# Patient Record
Sex: MALE | Race: BLACK OR AFRICAN AMERICAN | NOT HISPANIC OR LATINO | Employment: OTHER | URBAN - NONMETROPOLITAN AREA
[De-identification: names, ages, dates, MRNs, and addresses within clinical notes are randomized per-mention and may not be internally consistent; named-entity substitution may affect disease eponyms.]

---

## 2017-01-05 DIAGNOSIS — L60.0 INGROWN TOENAIL: Primary | ICD-10-CM

## 2017-01-13 ENCOUNTER — OFFICE VISIT (OUTPATIENT)
Dept: PODIATRY | Facility: CLINIC | Age: 57
End: 2017-01-13

## 2017-01-13 VITALS
SYSTOLIC BLOOD PRESSURE: 160 MMHG | HEART RATE: 96 BPM | WEIGHT: 221 LBS | BODY MASS INDEX: 30.94 KG/M2 | HEIGHT: 71 IN | DIASTOLIC BLOOD PRESSURE: 90 MMHG

## 2017-01-13 DIAGNOSIS — L84 CALLUS OF FOOT: ICD-10-CM

## 2017-01-13 DIAGNOSIS — B35.1 ONYCHOMYCOSIS: Primary | ICD-10-CM

## 2017-01-13 DIAGNOSIS — Z72.0 TOBACCO ABUSE: ICD-10-CM

## 2017-01-13 DIAGNOSIS — M79.671 FOOT PAIN, RIGHT: ICD-10-CM

## 2017-01-13 PROCEDURE — 11055 PARING/CUTG B9 HYPRKER LES 1: CPT | Performed by: PODIATRIST

## 2017-01-13 PROCEDURE — 99203 OFFICE O/P NEW LOW 30 MIN: CPT | Performed by: PODIATRIST

## 2017-01-13 PROCEDURE — 11720 DEBRIDE NAIL 1-5: CPT | Performed by: PODIATRIST

## 2017-01-13 RX ORDER — UREA 200 MG/G
GEL TOPICAL 2 TIMES DAILY
Qty: 90 G | Refills: 0 | Status: SHIPPED | OUTPATIENT
Start: 2017-01-13 | End: 2017-03-29

## 2017-01-13 NOTE — MR AVS SNAPSHOT
Favian Galeana   1/13/2017 1:15 PM   Office Visit    Dept Phone:  [phone]   Encounter #:  [id number]    Provider:  Gio Garcia DPM   Department:  [facility] PODIATRY                Your Full Care Plan              Today's Medication Changes          These changes are accurate as of: 1/13/17  1:49 PM.  If you have any questions, ask your nurse or doctor.               New Medication(s)Ordered:     urea 20 % cream   Commonly known as:  CARMOL   Apply  topically 2 (Two) Times a Day. Apply twice daily to calluses.   Started by:  Gio Garcia DPM            Where to Get Your Medications      These medications were sent to [facility] [address] [facility] [place], [place] - [address] AT [facility] [address] - [phone]  - [phone] FX  [address], [facility] [address]    Hours:  24-hours Phone:  [phone]     urea 20 % cream                  Your Updated Medication List          This list is accurate as of: 1/13/17  1:49 PM.  Always use your most recent med list.                albuterol 108 (90 BASE) MCG/ACT inhaler   Commonly known as:  PROVENTIL HFA;VENTOLIN HFA   Inhale 2 puffs Every 4 (Four) Hours As Needed for wheezing.       amLODIPine 10 MG tablet   Commonly known as:  NORVASC   Take 1 tablet by mouth Daily.       atorvastatin 40 MG tablet   Commonly known as:  LIPITOR   Take 1 tablet by mouth Daily.       azithromycin 250 MG tablet   Commonly known as:  ZITHROMAX Z-KEM   Take 2 tablets the first day, then 1 tablet daily for 4 days.       bisacodyl 5 MG EC tablet   Commonly known as:  DULCOLAX       gabapentin 300 MG capsule   Commonly known as:  NEURONTIN   Take 3 capsules by mouth 3 (Three) Times a Day.       hydrOXYzine 50 MG capsule   Commonly known as:  VISTARIL       losartan-hydrochlorothiazide 100-25 MG per tablet   Commonly known as:  HYZAAR   Take 1 tablet by mouth Daily.       omeprazole 40 MG capsule   Commonly  known as:  priLOSEC   Take 1 capsule by mouth Daily.       ondansetron 4 MG tablet   Commonly known as:  ZOFRAN       * oxyCODONE-acetaminophen 5-325 MG per tablet   Commonly known as:  PERCOCET       * oxyCODONE-acetaminophen  MG per tablet   Commonly known as:  PERCOCET       phenylephrine-promethazine-codeine 5-6.25-10 MG/5ML syrup syrup   Commonly known as:  PHENERGAN VC with CODEINE   Take 5 mL by mouth Every 4 (Four) Hours As Needed (cough).       urea 20 % cream   Commonly known as:  CARMOL   Apply  topically 2 (Two) Times a Day. Apply twice daily to calluses.       * Notice:  This list has 2 medication(s) that are the same as other medications prescribed for you. Read the directions carefully, and ask your doctor or other care provider to review them with you.            You Were Diagnosed With        Codes Comments    Onychomycosis    -  Primary ICD-10-CM: B35.1  ICD-9-CM: 110.1     Callus of foot     ICD-10-CM: L84  ICD-9-CM: 700     Foot pain, right     ICD-10-CM: M79.671  ICD-9-CM: 729.5       Instructions    Corns and Calluses  Corns are small areas of thickened skin that occur on the top, sides, or tip of a toe. They contain a cone-shaped core with a point that can press on a nerve below. This causes pain. Calluses are areas of thickened skin that can occur anywhere on the body including hands, fingers, palms, soles of the feet, and heels. Calluses are usually larger than corns.   CAUSES   Corns and calluses are caused by rubbing (friction) or pressure, such as from shoes that are too tight or do not fit properly.   RISK FACTORS  Corns are more likely to develop in people who have toe deformities, such as hammer toes.  Since calluses can occur with friction to any area of the skin, calluses are more likely to develop in people who:   · Work with their hands.  · Wear shoes that fit poorly, shoes that are too tight, or shoes that are high-heeled.  · Have toes deformities.  SYMPTOMS  Symptoms of a  corn or callus include:  · A hard growth on the skin.    · Pain or tenderness under the skin.    · Redness and swelling.    · Increased discomfort while wearing tight-fitting shoes.  DIAGNOSIS   Corns and calluses may be diagnosed with a medical history and physical exam.   TREATMENT   Corns and calluses may be treated with:  · Removing the cause of the friction or pressure. This may include:    Changing your shoes.    Wearing shoe inserts (orthotics) or other protective layers in your shoes, such as a corn pad.    Wearing gloves.  · Medicines to help soften skin in the hardened, thickened areas.  · Reducing the size of the corn or callus by removing the dead layers of skin.  · Antibiotic medicines to treat infection.  · Surgery, if a toe deformity is the cause.  HOME CARE INSTRUCTIONS   · Take medicines only as directed by your health care provider.  · If you were prescribed an antibiotic, finish all of it even if you start to feel better.  · Wear shoes that fit well. Avoid wearing high-heeled shoes and shoes that are too tight or too loose.  · Wear any padding, protective layers, gloves, or orthotics as directed by your health care provider.  · Soak your hands or feet and then use a file or pumice stone to soften your corn or callus. Do this as directed by your health care provider.  · Check your corn or callus every day for signs of infection. Watch for:    Redness, swelling, or pain.    Fluid, blood, or pus.  SEEK MEDICAL CARE IF:   · Your symptoms do not improve with treatment.  · You have increased redness, swelling, or pain at the site of your corn or callus.  · You have fluid, blood, or pus coming from your corn or callus.  · You have new symptoms.     This information is not intended to replace advice given to you by your health care provider. Make sure you discuss any questions you have with your health care provider.     Document Released: 09/23/2005 Document Revised: 05/03/2016 Document Reviewed:  12/14/2015  [employer] Interactive Patient Education ©2016 [employer]  Nail Ringworm  A fungal infection of the nail (tinea unguium/onychomycosis) is common. It is common as the visible part of the nail is composed of dead cells which have no blood supply to help prevent infection. It occurs because fungi are everywhere and will pick any opportunity to grow on any dead material.  Because nails are very slow growing they require up to 2 years of treatment with anti-fungal medications. The entire nail back to the base is infected. This includes approximately ? of the nail which you cannot see.  If your caregiver has prescribed a medication by mouth, take it every day and as directed. No progress will be seen for at least 6 to 9 months. Do not be disappointed! Because fungi live on dead cells with little or no exposure to blood supply, medication delivery to the infection is slow; thus the cure is slow. It is also why you can observe no progress in the first 6 months. The nail becoming cured is the base of the nail, as it has the blood supply. Topical medication such as creams and ointments are usually not effective. Important in successful treatment of nail fungus is closely following the medication regimen that your doctor prescribes.  Sometimes you and your caregiver may elect to speed up this process by surgical removal of all the nails. Even this may still require 6 to 9 months of additional oral medications.  See your caregiver as directed. Remember there will be no visible improvement for at least 6 months. See your caregiver sooner if other signs of infection (redness and swelling) develop.     This information is not intended to replace advice given to you by your health care provider. Make sure you discuss any questions you have with your health care provider.     Document Released: 12/15/2001 Document Revised: 05/03/2016 Document Reviewed: 06/20/2016  [employer] Patient Education ©2016 [employer]       Patient Instructions History      Upcoming Appointments     Visit Type Date Time Department    NEW PATIENT 1/13/2017  1:15 PM [facility]ODI[facility]    FOLLOW UP 2/27/2017 10:00 AM [facility] Signup     Our records indicate that you have declined [facility]. If you would like to sign up for [facility], please email [facility] [email or web address] or call [phone] to obtain an activation code.             Other Info from Your Visit           Your Appointments     Feb 27, 2017 10:00 AM CST   Follow Up with Jenaro Riley DO   [facility] (--)    [address] [place] [address]  [facility] [address] [phone]           Arrive 15 minutes prior to appointment.              Allergies     Iodine        Reason for Visit     Right Foot - Ingrown Toenail, Pain           Vital Signs     Blood Pressure Pulse Height Weight Body Mass Index Smoking Status    160/90 96 71\" (180.3 cm) 221 lb (100 kg) 30.82 kg/m2 Current Every Day Smoker      Problems and Diagnoses Noted     Onychomycosis    -  Primary    Callus of foot        Foot pain, right            "

## 2017-01-13 NOTE — PATIENT INSTRUCTIONS
Corns and Calluses  Corns are small areas of thickened skin that occur on the top, sides, or tip of a toe. They contain a cone-shaped core with a point that can press on a nerve below. This causes pain. Calluses are areas of thickened skin that can occur anywhere on the body including hands, fingers, palms, soles of the feet, and heels. Calluses are usually larger than corns.   CAUSES   Corns and calluses are caused by rubbing (friction) or pressure, such as from shoes that are too tight or do not fit properly.   RISK FACTORS  Corns are more likely to develop in people who have toe deformities, such as hammer toes.  Since calluses can occur with friction to any area of the skin, calluses are more likely to develop in people who:   · Work with their hands.  · Wear shoes that fit poorly, shoes that are too tight, or shoes that are high-heeled.  · Have toes deformities.  SYMPTOMS  Symptoms of a corn or callus include:  · A hard growth on the skin.    · Pain or tenderness under the skin.    · Redness and swelling.    · Increased discomfort while wearing tight-fitting shoes.  DIAGNOSIS   Corns and calluses may be diagnosed with a medical history and physical exam.   TREATMENT   Corns and calluses may be treated with:  · Removing the cause of the friction or pressure. This may include:    Changing your shoes.    Wearing shoe inserts (orthotics) or other protective layers in your shoes, such as a corn pad.    Wearing gloves.  · Medicines to help soften skin in the hardened, thickened areas.  · Reducing the size of the corn or callus by removing the dead layers of skin.  · Antibiotic medicines to treat infection.  · Surgery, if a toe deformity is the cause.  HOME CARE INSTRUCTIONS   · Take medicines only as directed by your health care provider.  · If you were prescribed an antibiotic, finish all of it even if you start to feel better.  · Wear shoes that fit well. Avoid wearing high-heeled shoes and shoes that are too tight  or too loose.  · Wear any padding, protective layers, gloves, or orthotics as directed by your health care provider.  · Soak your hands or feet and then use a file or pumice stone to soften your corn or callus. Do this as directed by your health care provider.  · Check your corn or callus every day for signs of infection. Watch for:    Redness, swelling, or pain.    Fluid, blood, or pus.  SEEK MEDICAL CARE IF:   · Your symptoms do not improve with treatment.  · You have increased redness, swelling, or pain at the site of your corn or callus.  · You have fluid, blood, or pus coming from your corn or callus.  · You have new symptoms.     This information is not intended to replace advice given to you by your health care provider. Make sure you discuss any questions you have with your health care provider.     Document Released: 09/23/2005 Document Revised: 05/03/2016 Document Reviewed: 12/14/2015  [employer] Patient Education ©2016 [employer]  Nail Ringworm  A fungal infection of the nail (tinea unguium/onychomycosis) is common. It is common as the visible part of the nail is composed of dead cells which have no blood supply to help prevent infection. It occurs because fungi are everywhere and will pick any opportunity to grow on any dead material.  Because nails are very slow growing they require up to 2 years of treatment with anti-fungal medications. The entire nail back to the base is infected. This includes approximately ? of the nail which you cannot see.  If your caregiver has prescribed a medication by mouth, take it every day and as directed. No progress will be seen for at least 6 to 9 months. Do not be disappointed! Because fungi live on dead cells with little or no exposure to blood supply, medication delivery to the infection is slow; thus the cure is slow. It is also why you can observe no progress in the first 6 months. The nail becoming cured is the base of the nail, as it has the blood  supply. Topical medication such as creams and ointments are usually not effective. Important in successful treatment of nail fungus is closely following the medication regimen that your doctor prescribes.  Sometimes you and your caregiver may elect to speed up this process by surgical removal of all the nails. Even this may still require 6 to 9 months of additional oral medications.  See your caregiver as directed. Remember there will be no visible improvement for at least 6 months. See your caregiver sooner if other signs of infection (redness and swelling) develop.     This information is not intended to replace advice given to you by your health care provider. Make sure you discuss any questions you have with your health care provider.     Document Released: 12/15/2001 Document Revised: 05/03/2016 Document Reviewed: 06/20/2016  [facility] Interactive Patient Education ©2016 [employer] Inc.

## 2017-01-13 NOTE — PROGRESS NOTES
Today's Date: 01/13/2017    Favian Galeana  MRN: [id number]  CSN: [id number]  PCP: Jenaro Riley DO      SUBJECTIVE     Chief Complaint   Patient presents with   • Right Foot - Ingrown Toenail, Pain     HPI: Favian Galeana, a 56 y.o.male, presents to clinic as a new patient complaining of painful right great toenail. Pt is non-diabetic but admits smoking 1 ppd. Relates the toe is extremely pain to the point he doesn't want to wear shoes or have anyone touch it. Relates he has had this problem off and on for many years.  Denies any constitutional symptoms. No other pedal complaints at this time.    Past Medical History   Diagnosis Date   • GERD (gastroesophageal reflux disease)    • Hypertension    • Neuropathy        Past Surgical History   Procedure Laterality Date   • Back surgery     • Exploratory laparotomy     • Hernia repair       3       Family History   Problem Relation Age of Onset   • Breast cancer Mother    • No Known Problems Father    • Prostate cancer Maternal Grandfather        Social History     Social History   • Marital status:      Spouse name: N/A   • Number of children: N/A   • Years of education: N/A     Occupational History   • Not on file.     Social History Main Topics   • Smoking status: Current Every Day Smoker     Packs/day: 1.00   • Smokeless tobacco: Never Used   • Alcohol use Yes   • Drug use: No   • Sexual activity: No     Other Topics Concern   • Not on file     Social History Narrative       Allergies   Allergen Reactions   • Iodine        Prior to Admission medications    Medication Sig Start Date End Date Taking? Authorizing Provider   albuterol (PROVENTIL HFA;VENTOLIN HFA) 108 (90 BASE) MCG/ACT inhaler Inhale 2 puffs Every 4 (Four) Hours As Needed for wheezing. 10/5/16  Yes CRISTINE Cardona   amLODIPine (NORVASC) 10 MG tablet Take 1 tablet by mouth Daily. 10/18/16  Yes Jenaro Riley DO   atorvastatin (LIPITOR) 40 MG tablet Take 1 tablet  by mouth Daily. 11/28/16  Yes Jenaro Riley, DO   azithromycin (ZITHROMAX Z-KEM) 250 MG tablet Take 2 tablets the first day, then 1 tablet daily for 4 days. 12/9/16  Yes SEAN Nelson   bisacodyl (DULCOLAX) 5 MG EC tablet Take 5 mg by mouth Daily As Needed for constipation.   Yes Historical Provider, MD   gabapentin (NEURONTIN) 300 MG capsule Take 3 capsules by mouth 3 (Three) Times a Day. 11/28/16  Yes Jenaro Riley, DO   hydrOXYzine (VISTARIL) 50 MG capsule Take 50 mg by mouth 3 (Three) Times a Day As Needed. 9/23/16  Yes Historical Provider, MD   losartan-hydrochlorothiazide (HYZAAR) 100-25 MG per tablet Take 1 tablet by mouth Daily. 11/28/16  Yes Jenaro Riley, DO   omeprazole (PriLOSEC) 40 MG capsule Take 1 capsule by mouth Daily. 10/18/16  Yes Jenaro Riley, DO   ondansetron (ZOFRAN) 4 MG tablet Take 4 mg by mouth Every 4 (Four) Hours As Needed. 7/14/16  Yes Historical Provider, MD   oxyCODONE-acetaminophen (PERCOCET)  MG per tablet Take 1 tablet by mouth 3 (Three) Times a Day.   Yes Historical Provider, MD   oxyCODONE-acetaminophen (PERCOCET) 5-325 MG per tablet Take 1 tablet by mouth Every 6 (Six) Hours As Needed.   Yes Historical Provider, MD   phenylephrine-promethazine-codeine (PHENERGAN VC with CODEINE) 5-6.25-10 MG/5ML syrup syrup Take 5 mL by mouth Every 4 (Four) Hours As Needed (cough). 12/9/16  Yes Mariajose Ny MD   urea (CARMOL) 20 % cream Apply  topically 2 (Two) Times a Day. Apply twice daily to calluses. 1/13/17   Gio Garcia DPM       Review of Systems   Constitutional: Negative for chills and fever.   HENT: Negative for congestion.    Respiratory: Negative for shortness of breath.    Cardiovascular: Negative for chest pain and leg swelling.   Gastrointestinal: Negative for constipation, diarrhea, nausea and vomiting.   Musculoskeletal:        Painful right hallux   Neurological: Negative for numbness.       OBJECTIVE     Vitals:     01/13/17 1315   BP: 160/90   Pulse: 96       PHYSICAL EXAM  GEN:   A&Ox3, NAD, Pt ambulates to clinic without assistance and wearing casual shoes. Accompanied by wife.    NEURO:   Protective sensation intact to 10/10 sites Right foot, 10/10 site Left foot using Taylor-Alejo monofilament  Light touch sensation intact  No Tinel's or Villeux sign.    VASC:  Skin temperature warm to warm proximal to distal lex  DP pulses 2/4 Right, 2/4 Left  PT pulses 1/4 Right, 1/4 Left  CFT <3sec lex  No edema noted lex  No varicosities noted lex    MUSC/SKEL:  Muscle Strength Right foot Dorsiflexors 5/5, Plantarflexors 5/5, Evertors 5/5, Invertors 5/5  Muscle Strength Left foot Dorsiflexors 5/5, Plantarflexors 5/5, Evertors 5/5, Invertors 5/5  POP of distal right hallux and nail    DERM:  Pedal nails 1 on right is thickened, elongated, discolored and incurvated at borders with subungual debris. At distal aspect of digit is a nucleated hyperkeratotic lesion abutting the nail plate. No break in integument post debridement  Webspaces are c/d/i  Skin is mildly atrophic with no open wounds or sores appreciated.      PATHOLOGY RESULTS:      RADIOLOGY/NUCLEAR:      LABORATORY/CULTURE RESULTS:     Lab Results (last 24 hours)     ** No results found for the last 24 hours. **          ASSESSMENT/PLAN     Patient Active Problem List   Diagnosis   • Neuropathy   • Gastroesophageal reflux disease without esophagitis   • Encounter for preventive health examination   • Essential hypertension   • Tobacco abuse   • Mixed hyperlipidemia         ICD-10-CM ICD-9-CM   1. Onychomycosis B35.1 110.1   2. Callus of foot L84 700   3. Foot pain, right M79.671 729.5   4. Tobacco abuse Z72.0 305.1     -Pt was examined and evaluated  -Discussed findings and treatment options with patient in detail  -Reviewed any pertinent xrays, labs and studies from pt chart  -After verbal consent obtained, debridement of nail x1 of length and thickness without  incidence  -paring of callus x1 without incidence  -Pt may use emery board at home to maintain nails between visits  -Rx: Urea 20% Cream  -RTC PRN, or sooner if acute issues arise.              Please note that portions of this note may have been completed with a voice recognition program. Efforts were made to edit the dictations, but occasionally words are mistranscribed.

## 2017-02-14 ENCOUNTER — HOSPITAL ENCOUNTER (EMERGENCY)
Facility: HOSPITAL | Age: 57
Discharge: HOME | End: 2017-02-14
Admitting: EMERGENCY MEDICINE

## 2017-02-14 ENCOUNTER — APPOINTMENT (OUTPATIENT)
Dept: GENERAL RADIOLOGY | Facility: HOSPITAL | Age: 57
End: 2017-02-14

## 2017-02-14 ENCOUNTER — APPOINTMENT (OUTPATIENT)
Dept: CT IMAGING | Facility: HOSPITAL | Age: 57
End: 2017-02-14

## 2017-02-14 VITALS
RESPIRATION RATE: 18 BRPM | SYSTOLIC BLOOD PRESSURE: 133 MMHG | HEART RATE: 89 BPM | WEIGHT: 216.38 LBS | DIASTOLIC BLOOD PRESSURE: 87 MMHG | TEMPERATURE: 99 F | HEIGHT: 71 IN | BODY MASS INDEX: 30.29 KG/M2 | OXYGEN SATURATION: 100 %

## 2017-02-14 DIAGNOSIS — K40.90 INGUINAL HERNIA WITHOUT OBSTRUCTION OR GANGRENE, RECURRENCE NOT SPECIFIED, UNSPECIFIED LATERALITY: ICD-10-CM

## 2017-02-14 DIAGNOSIS — J06.9 UPPER RESPIRATORY TRACT INFECTION, UNSPECIFIED TYPE: Primary | ICD-10-CM

## 2017-02-14 DIAGNOSIS — J11.1 INFLUENZA: ICD-10-CM

## 2017-02-14 LAB
ALBUMIN SERPL-MCNC: 4 G/DL (ref 3.5–5)
ALBUMIN/GLOB SERPL: 1.2 G/DL (ref 1.1–2.5)
ALP SERPL-CCNC: 122 U/L (ref 24–120)
ALT SERPL W P-5'-P-CCNC: 48 U/L (ref 0–54)
AMYLASE SERPL-CCNC: 142 U/L (ref 30–110)
ANION GAP SERPL CALCULATED.3IONS-SCNC: 9 MMOL/L (ref 4–13)
APTT PPP: 32 SECONDS (ref 24.1–34.8)
AST SERPL-CCNC: 33 U/L (ref 7–45)
BASOPHILS # BLD AUTO: 0.02 10*3/MM3 (ref 0–0.2)
BASOPHILS NFR BLD AUTO: 0.2 % (ref 0–2)
BILIRUB SERPL-MCNC: 0.5 MG/DL (ref 0.1–1)
BILIRUB UR QL STRIP: NEGATIVE
BUN BLD-MCNC: 12 MG/DL (ref 5–21)
BUN/CREAT SERPL: 9.8 (ref 7–25)
CALCIUM SPEC-SCNC: 8.8 MG/DL (ref 8.4–10.4)
CHLORIDE SERPL-SCNC: 108 MMOL/L (ref 98–110)
CLARITY UR: CLEAR
CO2 SERPL-SCNC: 24 MMOL/L (ref 24–31)
COLOR UR: YELLOW
CREAT BLD-MCNC: 1.22 MG/DL (ref 0.5–1.4)
CRP SERPL-MCNC: <0.5 MG/DL (ref 0–0.99)
D-LACTATE SERPL-SCNC: 1.2 MMOL/L (ref 0.5–2)
DEPRECATED RDW RBC AUTO: 48.1 FL (ref 40–54)
EOSINOPHIL # BLD AUTO: 0.21 10*3/MM3 (ref 0–0.7)
EOSINOPHIL NFR BLD AUTO: 2.5 % (ref 0–4)
ERYTHROCYTE [DISTWIDTH] IN BLOOD BY AUTOMATED COUNT: 15.4 % (ref 12–15)
FLUAV AG NPH QL: NEGATIVE
FLUBV AG NPH QL IA: NEGATIVE
GFR SERPL CREATININE-BSD FRML MDRD: 74 ML/MIN/1.73
GLOBULIN UR ELPH-MCNC: 3.3 GM/DL
GLUCOSE BLD-MCNC: 99 MG/DL (ref 70–100)
GLUCOSE UR STRIP-MCNC: NEGATIVE MG/DL
HCT VFR BLD AUTO: 40 % (ref 40–52)
HGB BLD-MCNC: 13.3 G/DL (ref 14–18)
HGB UR QL STRIP.AUTO: NEGATIVE
HOLD SPECIMEN: NORMAL
HOLD SPECIMEN: NORMAL
IMM GRANULOCYTES # BLD: 0.04 10*3/MM3 (ref 0–0.03)
IMM GRANULOCYTES NFR BLD: 0.5 % (ref 0–5)
INR PPP: 0.94 (ref 0.91–1.09)
KETONES UR QL STRIP: NEGATIVE
LEUKOCYTE ESTERASE UR QL STRIP.AUTO: NEGATIVE
LIPASE SERPL-CCNC: 200 U/L (ref 23–203)
LYMPHOCYTES # BLD AUTO: 1.59 10*3/MM3 (ref 0.72–4.86)
LYMPHOCYTES NFR BLD AUTO: 19 % (ref 15–45)
MCH RBC QN AUTO: 28.6 PG (ref 28–32)
MCHC RBC AUTO-ENTMCNC: 33.3 G/DL (ref 33–36)
MCV RBC AUTO: 86 FL (ref 82–95)
MONOCYTES # BLD AUTO: 1.11 10*3/MM3 (ref 0.19–1.3)
MONOCYTES NFR BLD AUTO: 13.3 % (ref 4–12)
NEUTROPHILS # BLD AUTO: 5.38 10*3/MM3 (ref 1.87–8.4)
NEUTROPHILS NFR BLD AUTO: 64.5 % (ref 39–78)
NITRITE UR QL STRIP: NEGATIVE
PH UR STRIP.AUTO: 5.5 [PH] (ref 5–8)
PLATELET # BLD AUTO: 285 10*3/MM3 (ref 130–400)
PMV BLD AUTO: 10.6 FL (ref 6–12)
POTASSIUM BLD-SCNC: 3.8 MMOL/L (ref 3.5–5.3)
PROCALCITONIN SERPL-MCNC: <0.25 NG/ML
PROT SERPL-MCNC: 7.3 G/DL (ref 6.3–8.7)
PROT UR QL STRIP: NEGATIVE
PROTHROMBIN TIME: 12.8 SECONDS (ref 11.9–14.6)
RBC # BLD AUTO: 4.65 10*6/MM3 (ref 4.8–5.9)
S PYO AG THROAT QL: NEGATIVE
SODIUM BLD-SCNC: 141 MMOL/L (ref 135–145)
SP GR UR STRIP: 1.01 (ref 1–1.03)
TROPONIN I SERPL-MCNC: 0 NG/ML (ref 0–0.07)
UROBILINOGEN UR QL STRIP: NORMAL
WBC NRBC COR # BLD: 8.35 10*3/MM3 (ref 4.8–10.8)
WHOLE BLOOD HOLD SPECIMEN: NORMAL
WHOLE BLOOD HOLD SPECIMEN: NORMAL

## 2017-02-14 PROCEDURE — 99285 EMERGENCY DEPT VISIT HI MDM: CPT

## 2017-02-14 PROCEDURE — 84484 ASSAY OF TROPONIN QUANT: CPT

## 2017-02-14 PROCEDURE — 87880 STREP A ASSAY W/OPTIC: CPT | Performed by: NURSE PRACTITIONER

## 2017-02-14 PROCEDURE — 82150 ASSAY OF AMYLASE: CPT | Performed by: NURSE PRACTITIONER

## 2017-02-14 PROCEDURE — 84145 PROCALCITONIN (PCT): CPT | Performed by: NURSE PRACTITIONER

## 2017-02-14 PROCEDURE — 71020 HC CHEST PA AND LATERAL: CPT

## 2017-02-14 PROCEDURE — 80053 COMPREHEN METABOLIC PANEL: CPT

## 2017-02-14 PROCEDURE — 96361 HYDRATE IV INFUSION ADD-ON: CPT

## 2017-02-14 PROCEDURE — 87804 INFLUENZA ASSAY W/OPTIC: CPT | Performed by: NURSE PRACTITIONER

## 2017-02-14 PROCEDURE — 83605 ASSAY OF LACTIC ACID: CPT | Performed by: NURSE PRACTITIONER

## 2017-02-14 PROCEDURE — 87081 CULTURE SCREEN ONLY: CPT | Performed by: NURSE PRACTITIONER

## 2017-02-14 PROCEDURE — 85025 COMPLETE CBC W/AUTO DIFF WBC: CPT

## 2017-02-14 PROCEDURE — 74176 CT ABD & PELVIS W/O CONTRAST: CPT

## 2017-02-14 PROCEDURE — 81003 URINALYSIS AUTO W/O SCOPE: CPT | Performed by: NURSE PRACTITIONER

## 2017-02-14 PROCEDURE — 85730 THROMBOPLASTIN TIME PARTIAL: CPT | Performed by: NURSE PRACTITIONER

## 2017-02-14 PROCEDURE — 93010 ELECTROCARDIOGRAM REPORT: CPT | Performed by: INTERNAL MEDICINE

## 2017-02-14 PROCEDURE — 86140 C-REACTIVE PROTEIN: CPT | Performed by: NURSE PRACTITIONER

## 2017-02-14 PROCEDURE — 87040 BLOOD CULTURE FOR BACTERIA: CPT | Performed by: NURSE PRACTITIONER

## 2017-02-14 PROCEDURE — 83690 ASSAY OF LIPASE: CPT | Performed by: NURSE PRACTITIONER

## 2017-02-14 PROCEDURE — 96360 HYDRATION IV INFUSION INIT: CPT

## 2017-02-14 PROCEDURE — 85610 PROTHROMBIN TIME: CPT | Performed by: NURSE PRACTITIONER

## 2017-02-14 PROCEDURE — 93005 ELECTROCARDIOGRAM TRACING: CPT | Performed by: NURSE PRACTITIONER

## 2017-02-14 RX ORDER — ONDANSETRON 2 MG/ML
4 INJECTION INTRAMUSCULAR; INTRAVENOUS ONCE
Status: DISCONTINUED | OUTPATIENT
Start: 2017-02-14 | End: 2017-02-14 | Stop reason: HOSPADM

## 2017-02-14 RX ORDER — SODIUM CHLORIDE 0.9 % (FLUSH) 0.9 %
10 SYRINGE (ML) INJECTION AS NEEDED
Status: DISCONTINUED | OUTPATIENT
Start: 2017-02-14 | End: 2017-02-14 | Stop reason: HOSPADM

## 2017-02-14 RX ORDER — DEXTROMETHORPHAN HYDROBROMIDE AND PROMETHAZINE HYDROCHLORIDE 15; 6.25 MG/5ML; MG/5ML
5 SYRUP ORAL 4 TIMES DAILY PRN
Qty: 180 ML | Refills: 0 | Status: SHIPPED | OUTPATIENT
Start: 2017-02-14 | End: 2017-02-17

## 2017-02-14 RX ORDER — METHYLPREDNISOLONE 4 MG/1
TABLET ORAL
Qty: 21 TABLET | Refills: 0 | Status: SHIPPED | OUTPATIENT
Start: 2017-02-14 | End: 2017-03-29

## 2017-02-14 RX ORDER — OSELTAMIVIR PHOSPHATE 75 MG/1
75 CAPSULE ORAL 2 TIMES DAILY
Qty: 10 CAPSULE | Refills: 0 | Status: SHIPPED | OUTPATIENT
Start: 2017-02-14 | End: 2017-02-19

## 2017-02-14 RX ORDER — ONDANSETRON 4 MG/1
4 TABLET, ORALLY DISINTEGRATING ORAL EVERY 6 HOURS PRN
Qty: 12 TABLET | Refills: 0 | Status: SHIPPED | OUTPATIENT
Start: 2017-02-14 | End: 2017-02-17

## 2017-02-14 RX ORDER — AZITHROMYCIN 250 MG/1
TABLET, FILM COATED ORAL
Qty: 6 TABLET | Refills: 0 | Status: SHIPPED | OUTPATIENT
Start: 2017-02-14 | End: 2017-03-29

## 2017-02-14 RX ADMIN — SODIUM CHLORIDE 1000 ML: 9 INJECTION, SOLUTION INTRAVENOUS at 11:57

## 2017-02-14 NOTE — ED PROVIDER NOTES
"Subjective   HPI Comments: Patient is a 56-year-old -American male who presents ER today with complaint of cough congestion and abdominal pain  Patient reports that he has symptoms of cough congestion 2 days ago.  He is coughing up thick white phlegm.  Patient also reports hot flashes and chills at home.  He reports that he is grandchildren who live with him diagnosed with the flu yesterday.  Patient also reports that he is having some left lower abdominal pain.  Patient reports he has a history of inguinal hernia.  Reports he coughs this does calm out\".  He states he is able to reduce it himself without difficulty.  Patient states that he is however having some increased pain to the area like to be evaluated for this well.  He presents with his wife for evaluation today.    Patient is a 56 y.o. male presenting with abdominal pain.   History provided by:  Patient   used: No    Abdominal Pain   Pain location:  LLQ  Pain quality: sharp and stabbing    Pain radiates to:  Does not radiate  Pain severity:  Mild  Onset quality:  Sudden  Duration:  1 day  Timing:  Constant  Progression:  Unchanged  Chronicity:  New  Relieved by:  Nothing  Worsened by:  Nothing  Ineffective treatments:  None tried  Associated symptoms: chills and nausea    Associated symptoms: no anorexia, no belching, no chest pain, no constipation, no cough, no diarrhea, no dysuria, no fatigue, no fever, no flatus, no hematemesis, no hematochezia, no hematuria, no melena, no shortness of breath, no sore throat, no vaginal bleeding, no vaginal discharge and no vomiting    Risk factors: multiple surgeries and obesity    Risk factors: no alcohol abuse, no aspirin use, not elderly, no NSAID use, not pregnant and no recent hospitalization        Review of Systems   Constitutional: Positive for chills. Negative for fatigue and fever.   HENT: Negative for sore throat.    Respiratory: Negative for cough and shortness of breath.  "   Cardiovascular: Negative for chest pain.   Gastrointestinal: Positive for abdominal pain and nausea. Negative for anorexia, constipation, diarrhea, flatus, hematemesis, hematochezia, melena and vomiting.   Genitourinary: Negative for dysuria, hematuria, vaginal bleeding and vaginal discharge.   All other systems reviewed and are negative.      Past Medical History   Diagnosis Date   • GERD (gastroesophageal reflux disease)    • Hypertension    • Neuropathy        Allergies   Allergen Reactions   • Iodine        Past Surgical History   Procedure Laterality Date   • Back surgery     • Exploratory laparotomy     • Hernia repair       3       Family History   Problem Relation Age of Onset   • Breast cancer Mother    • No Known Problems Father    • Prostate cancer Maternal Grandfather        Social History     Social History   • Marital status:      Spouse name: N/A   • Number of children: N/A   • Years of education: N/A     Social History Main Topics   • Smoking status: Current Every Day Smoker     Packs/day: 1.00   • Smokeless tobacco: Never Used   • Alcohol use Yes   • Drug use: No   • Sexual activity: No     Other Topics Concern   • None     Social History Narrative           Objective   Physical Exam   Constitutional: He is oriented to person, place, and time. He appears well-developed and well-nourished.   HENT:   Head: Normocephalic and atraumatic.   Eyes: Conjunctivae are normal. Pupils are equal, round, and reactive to light.   Neck: Normal range of motion. Neck supple.   Cardiovascular: Normal rate, regular rhythm and normal heart sounds.    Pulmonary/Chest: Effort normal and breath sounds normal.   Abdominal: Soft. Bowel sounds are normal.       Musculoskeletal: Normal range of motion.   Neurological: He is alert and oriented to person, place, and time.   Skin: Skin is warm and dry.   Psychiatric: He has a normal mood and affect.   Nursing note and vitals reviewed.      Procedures         ED  Course  ED Course   Comment By Time   CXR: No acute cardiopulmonary disease. Dee Macias, APRN 02/14 1225   CT abd/pelvis: 1. A colon-containing left lateral abdominal wall hernia and  fat-containing left inguinal hernia is not significantly changed.  2. Small bowel containing right inguinal hernia is again seen though  there is now appears to be small amount of fluid and slightly increased  inflammation in the right inguinal region.  3. No bowel obstruction.    Dee Macias, APRN 02/14 1405   Patient labs at this time.  His white blood cell count was 8.35.  Urinalysis unremarkable, flu swab negative.  Lactic acid was normal 0.2.  CMP unremarkable. Dee Macias, APRN 02/14 1451   CT scan was reviewed with patient.  He is aware of the renal cyst.  I discussed the patient's results of the CT scan with Dr. Heard the surgeon on call.  He is recommended the patient follow up with Dr. Prieto is further evaluation area at this time patient be discharged home in stable condition.  I will give him some Zofran for his nausea.  I will go ahead and treat him were removed.  Family members have all tested positive.  I will place approximately Tamiflu.  I will give him an antibiotic for his cough as well as a Medrol Dosepak and some cough.  This time patient discharged home in stable condition. I have asked the pt to  please follow-up Dr. Prieto this week.  Lastly please follow-up her regular physician tomorrow for recheck.  He will be discharged home at this time stable condition. Dee Macias, APRN 02/14 1453                  MDM  Number of Diagnoses or Management Options  Influenza: new and requires workup  Inguinal hernia without obstruction or gangrene, recurrence not specified, unspecified laterality: new and requires workup  Upper respiratory tract infection, unspecified type: new and requires workup     Amount and/or Complexity of Data Reviewed  Clinical lab tests: ordered and reviewed  Tests in the  radiology section of CPT®: ordered and reviewed  Discuss the patient with other providers: yes    Patient Progress  Patient progress: stable      Final diagnoses:   Upper respiratory tract infection, unspecified type   Influenza   Inguinal hernia without obstruction or gangrene, recurrence not specified, unspecified laterality            Dee Macias, APRN  02/14/17 145

## 2017-02-16 LAB — BACTERIA SPEC AEROBE CULT: NORMAL

## 2017-02-19 LAB
BACTERIA SPEC AEROBE CULT: NORMAL
BACTERIA SPEC AEROBE CULT: NORMAL

## 2017-03-29 ENCOUNTER — APPOINTMENT (OUTPATIENT)
Dept: PREADMISSION TESTING | Facility: HOSPITAL | Age: 57
End: 2017-03-29

## 2017-03-29 VITALS
BODY MASS INDEX: 29.35 KG/M2 | HEIGHT: 70 IN | SYSTOLIC BLOOD PRESSURE: 150 MMHG | WEIGHT: 205 LBS | OXYGEN SATURATION: 100 % | RESPIRATION RATE: 20 BRPM | DIASTOLIC BLOOD PRESSURE: 90 MMHG | HEART RATE: 64 BPM

## 2017-03-29 LAB
ALBUMIN SERPL-MCNC: 4.1 G/DL (ref 3.5–5)
ALBUMIN/GLOB SERPL: 1.3 G/DL (ref 1.1–2.5)
ALP SERPL-CCNC: 91 U/L (ref 24–120)
ALT SERPL W P-5'-P-CCNC: 27 U/L (ref 0–54)
ANION GAP SERPL CALCULATED.3IONS-SCNC: 13 MMOL/L (ref 4–13)
AST SERPL-CCNC: 30 U/L (ref 7–45)
BASOPHILS # BLD AUTO: 0.05 10*3/MM3 (ref 0–0.2)
BASOPHILS NFR BLD AUTO: 0.6 % (ref 0–2)
BILIRUB SERPL-MCNC: 0.3 MG/DL (ref 0.1–1)
BUN BLD-MCNC: 16 MG/DL (ref 5–21)
BUN/CREAT SERPL: 12.1 (ref 7–25)
CALCIUM SPEC-SCNC: 9.4 MG/DL (ref 8.4–10.4)
CHLORIDE SERPL-SCNC: 105 MMOL/L (ref 98–110)
CO2 SERPL-SCNC: 25 MMOL/L (ref 24–31)
CREAT BLD-MCNC: 1.32 MG/DL (ref 0.5–1.4)
DEPRECATED RDW RBC AUTO: 45.3 FL (ref 40–54)
EOSINOPHIL # BLD AUTO: 0.24 10*3/MM3 (ref 0–0.7)
EOSINOPHIL NFR BLD AUTO: 3 % (ref 0–4)
ERYTHROCYTE [DISTWIDTH] IN BLOOD BY AUTOMATED COUNT: 14.2 % (ref 12–15)
GFR SERPL CREATININE-BSD FRML MDRD: 68 ML/MIN/1.73
GLOBULIN UR ELPH-MCNC: 3.2 GM/DL
GLUCOSE BLD-MCNC: 144 MG/DL (ref 70–100)
HCT VFR BLD AUTO: 42.3 % (ref 40–52)
HGB BLD-MCNC: 14.2 G/DL (ref 14–18)
IMM GRANULOCYTES # BLD: 0.02 10*3/MM3 (ref 0–0.03)
IMM GRANULOCYTES NFR BLD: 0.3 % (ref 0–5)
LYMPHOCYTES # BLD AUTO: 2.54 10*3/MM3 (ref 0.72–4.86)
LYMPHOCYTES NFR BLD AUTO: 32.2 % (ref 15–45)
MCH RBC QN AUTO: 29.3 PG (ref 28–32)
MCHC RBC AUTO-ENTMCNC: 33.6 G/DL (ref 33–36)
MCV RBC AUTO: 87.2 FL (ref 82–95)
MONOCYTES # BLD AUTO: 0.61 10*3/MM3 (ref 0.19–1.3)
MONOCYTES NFR BLD AUTO: 7.7 % (ref 4–12)
NEUTROPHILS # BLD AUTO: 4.44 10*3/MM3 (ref 1.87–8.4)
NEUTROPHILS NFR BLD AUTO: 56.2 % (ref 39–78)
PLATELET # BLD AUTO: 291 10*3/MM3 (ref 130–400)
PMV BLD AUTO: 10.4 FL (ref 6–12)
POTASSIUM BLD-SCNC: 3.8 MMOL/L (ref 3.5–5.3)
PROT SERPL-MCNC: 7.3 G/DL (ref 6.3–8.7)
RBC # BLD AUTO: 4.85 10*6/MM3 (ref 4.8–5.9)
SODIUM BLD-SCNC: 143 MMOL/L (ref 135–145)
WBC NRBC COR # BLD: 7.9 10*3/MM3 (ref 4.8–10.8)

## 2017-03-29 PROCEDURE — 93005 ELECTROCARDIOGRAM TRACING: CPT

## 2017-03-29 PROCEDURE — 93010 ELECTROCARDIOGRAM REPORT: CPT | Performed by: INTERNAL MEDICINE

## 2017-03-29 PROCEDURE — 80053 COMPREHEN METABOLIC PANEL: CPT | Performed by: SPECIALIST

## 2017-03-29 PROCEDURE — 85025 COMPLETE CBC W/AUTO DIFF WBC: CPT | Performed by: SPECIALIST

## 2017-03-29 NOTE — DISCHARGE INSTRUCTIONS
DAY OF SURGERY INSTRUCTIONS        YOUR SURGEON: April brennen    PROCEDURE: left inguinal hernia repair with mesh    DATE OF SURGERY: 3/31/2017    ARRIVAL TIME: AS DIRECTED BY OFFICE    DAY OF SURGERY TAKE ONLY THESE MEDICATIONS: ***none        BEFORE YOU COME TO THE HOSPITAL  (Pre-op instructions)  • Do not eat, drink, smoke or chew gum after midnight the night before surgery.  This also includes no mints.  • Morning of surgery take only the medicines you have been instructed with a sip of water unless otherwise instructed  by your physician.  • Do not shave, wear makeup or dark nail polish.  • Remove all jewelry including rings.  • Leave anything you consider valuable at home.  • Leave your suitcase in the car until after your surgery.  • Bring the following with you if applicable:  o Picture ID and insurance, Medicare or Medicaid cards  o Co-pay/deductible required by insurance (cash, check, credit card)  o Medications (no narcotics) in original bottles (not a list) including all over-the-counter medications.  o Copy of advance directive, living will or power-of- documents if not brought to PAT  o CPAP or BIPAP mask and tubing  o Skin prep instruction sheet  o Relaxation aids (MP3 player, book, magazine)  • Confirm your arrival time with you surgeon the day before your surgery (surgery times are subject to change)  • On the day of surgery check in at registration located at the main entrance of the hospital.       Outpatient Surgery Guidelines, Adult  Outpatient procedures are those for which the person having the procedure is allowed to go home the same day as the procedure. Various procedures are done on an outpatient basis. You should follow some general guidelines if you will be having an outpatient procedure.  LET YOUR HEALTH CARE PROVIDER KNOW ABOUT:  · Any allergies you have.  · All medicines you are taking, including vitamins, herbs, eye drops, creams, and over-the-counter  medicines.  · Previous problems you or members of your family have had with the use of anesthetics.  · Any blood disorders you have.  · Previous surgeries you have had.  · Medical conditions you have.  RISKS AND COMPLICATIONS  Your health care provider will discuss possible risks and complications with you before surgery. Common risks and complications include:    · Problems due to the use of anesthetics.  · Blood loss and replacement (does not apply to minor surgical procedures).  · Temporary increase in pain due to surgery.  · Uncorrected pain or problems that the surgery was meant to correct.  · Infection.  · New damage.  BEFORE THE PROCEDURE  · Ask your health care provider about changing or stopping your regular medicines. You may need to stop taking certain medicines in the days or weeks before the procedure.  · Stop smoking at least 2 weeks before surgery. This lowers your risk for complications during and after surgery. Ask your health care provider for help with this if needed.  · Eat your usual meals and a light supper the day before surgery. Continue fluid intake. Do not drink alcohol.  · Do not eat or drink after midnight the night before your surgery.   · Arrange for someone to take you home and to stay with you for 24 hours after the procedure. Medicine given for your procedure may affect your ability to drive or to care for yourself.  · Call your health care provider's office if you develop an illness or problem that may prevent you from safely having your procedure.  AFTER THE PROCEDURE  After surgery, you will be taken to a recovery area, where your progress will be monitored. If there are no complications, you will be allowed to go home when you are awake, stable, and taking fluids well. You may have numbness around the surgical site. Healing will take some time. You will have tenderness at the surgical site and may have some swelling and bruising. You may also have some nausea.  HOME CARE  INSTRUCTIONS  · Do not drive for 24 hours, or as directed by your health care provider. Do not drive while taking prescription pain medicines.  · Do not drink alcohol for 24 hours.  · Do not make important decisions or sign legal documents for 24 hours.  · You may resume a normal diet and activities as directed.  · Do not lift anything heavier than 10 pounds (4.5 kg) or play contact sports until your health care provider says it is okay.  · Change your bandages (dressings) as directed.  · Only take over-the-counter or prescription medicines as directed by your health care provider.  · Follow up with your health care provider as directed.  SEEK MEDICAL CARE IF:  · You have increased bleeding (more than a small spot) from the surgical site.  · You have redness, swelling, or increasing pain in the wound.  · You see pus coming from the wound.  · You have a fever.  · You notice a bad smell coming from the wound or dressing.  · You feel lightheaded or faint.  · You develop a rash.  · You have trouble breathing.  · You develop allergies.  MAKE SURE YOU:  · Understand these instructions.  · Will watch your condition.  · Will get help right away if you are not doing well or get worse.     This information is not intended to replace advice given to you by your health care provider. Make sure you discuss any questions you have with your health care provider.     Document Released: 09/12/2002 Document Revised: 05/03/2016 Document Reviewed: 05/22/2014  "[employer] Interactive Patient Education ©2016 "[employer] Inc.       Fall Prevention in Hospitals, Adult  As a hospital patient, your condition and the treatments you receive can increase your risk for falls. Some additional risk factors for falls in a hospital include:  · Being in an unfamiliar environment.  · Being on bed rest.  · Your surgery.  · Taking certain medicines.  · Your tubing requirements, such as intravenous (IV) therapy or catheters.  It is important that you learn how  to decrease fall risks while at the hospital. Below are important tips that can help prevent falls.  SAFETY TIPS FOR PREVENTING FALLS  Talk about your risk of falling.  · Ask your health care provider why you are at risk for falling. Is it your medicine, illness, tubing placement, or something else?  · Make a plan with your health care provider to keep you safe from falls.  · Ask your health care provider or pharmacist about side effects of your medicines. Some medicines can make you dizzy or affect your coordination.  Ask for help.  · Ask for help before getting out of bed. You may need to press your call button.  · Ask for assistance in getting safely to the toilet.  · Ask for a walker or cane to be put at your bedside. Ask that most of the side rails on your bed be placed up before your health care provider leaves the room.  · Ask family or friends to sit with you.  · Ask for things that are out of your reach, such as your glasses, hearing aids, telephone, bedside table, or call button.  Follow these tips to avoid falling:  · Stay lying or seated, rather than standing, while waiting for help.  · Wear rubber-soled slippers or shoes whenever you walk in the hospital.  · Avoid quick, sudden movements.  ¨ Change positions slowly.  ¨ Sit on the side of your bed before standing.  ¨ Stand up slowly and wait before you start to walk.  · Let your health care provider know if there is a spill on the floor.  · Pay careful attention to the medical equipment, electrical cords, and tubes around you.  · When you need help, use your call button by your bed or in the bathroom. Wait for one of your health care providers to help you.  · If you feel dizzy or unsure of your footing, return to bed and wait for assistance.  · Avoid being distracted by the TV, telephone, or another person in your room.  · Do not lean or support yourself on rolling objects, such as IV poles or bedside tables.     This information is not intended to  replace advice given to you by your health care provider. Make sure you discuss any questions you have with your health care provider.     Document Released: 12/15/2001 Document Revised: 01/08/2016 Document Reviewed: 08/25/2013  [employer] Interactive Patient Education ©2016 [employer]       Surgical Site Infections FAQs  What is a Surgical Site Infection (SSI)?  A surgical site infection is an infection that occurs after surgery in the part of the body where the surgery took place. Most patients who have surgery do not develop an infection. However, infections develop in about 1 to 3 out of every 100 patients who have surgery.  Some of the common symptoms of a surgical site infection are:  · Redness and pain around the area where you had surgery  · Drainage of cloudy fluid from your surgical wound  · Fever  Can SSIs be treated?  Yes. Most surgical site infections can be treated with antibiotics. The antibiotic given to you depends on the bacteria (germs) causing the infection. Sometimes patients with SSIs also need another surgery to treat the infection.  What are some of the things that hospitals are doing to prevent SSIs?  To prevent SSIs, doctors, nurses, and other healthcare providers:  · Clean their hands and arms up to their elbows with an antiseptic agent just before the surgery.  · Clean their hands with soap and water or an alcohol-based hand rub before and after caring for each patient.  · May remove some of your hair immediately before your surgery using electric clippers if the hair is in the same area where the procedure will occur. They should not shave you with a razor.  · Wear special hair covers, masks, gowns, and gloves during surgery to keep the surgery area clean.  · Give you antibiotics before your surgery starts. In most cases, you should get antibiotics within 60 minutes before the surgery starts and the antibiotics should be stopped within 24 hours after surgery.  · Clean the skin at the  site of your surgery with a special soap that kills germs.  What can I do to help prevent SSIs?  Before your surgery:  · Tell your doctor about other medical problems you may have. Health problems such as allergies, diabetes, and obesity could affect your surgery and your treatment.  · Quit smoking. Patients who smoke get more infections. Talk to your doctor about how you can quit before your surgery.  · Do not shave near where you will have surgery. Shaving with a razor can irritate your skin and make it easier to develop an infection.  At the time of your surgery:  · Speak up if someone tries to shave you with a razor before surgery. Ask why you need to be shaved and talk with your surgeon if you have any concerns.  · Ask if you will get antibiotics before surgery.  After your surgery:  · Make sure that your healthcare providers clean their hands before examining you, either with soap and water or an alcohol-based hand rub.  · If you do not see your providers clean their hands, please ask them to do so.  · Family and friends who visit you should not touch the surgical wound or dressings.  · Family and friends should clean their hands with soap and water or an alcohol-based hand rub before and after visiting you. If you do not see them clean their hands, ask them to clean their hands.  What do I need to do when I go home from the hospital?  · Before you go home, your doctor or nurse should explain everything you need to know about taking care of your wound. Make sure you understand how to care for your wound before you leave the hospital.  · Always clean your hands before and after caring for your wound.  · Before you go home, make sure you know who to contact if you have questions or problems after you get home.  · If you have any symptoms of an infection, such as redness and pain at the surgery site, drainage, or fever, call your doctor immediately.  If you have additional questions, please ask your doctor or  nurse.  Developed and co-sponsored by The Society for Healthcare Epidemiology of [place] (SHEA); Infectious Diseases Society of [place] (IDSA); American Hospital Association; Association for Professionals in Infection Control and Epidemiology (APIC); Centers for Disease Control and Prevention (CDC); and The Joint Commission.     This information is not intended to replace advice given to you by your health care provider. Make sure you discuss any questions you have with your health care provider.     Document Released: 12/23/2014 Document Revised: 01/08/2016 Document Reviewed: 03/02/2016  [employer] Interactive Patient Education ©2016 [employer]       [facility]  CHG 4% Patient Instruction Sheet    Preparing the Skin Before Surgery  Preparing or “prepping” skin before surgery can reduce the risk of infection at the surgical site. To make the process easier,[facility] has chosen 4% Chlorhexidine Gluconate (CHG) antiseptic solution.   The steps below outline the prepping process and should be carefully followed.                                                                                                                                                      Prep the skin at the following time(s):                                                      We recommend you shower the night before surgery, and again the morning of surgery with the 4% CHG antiseptic solution using half of the bottle and a cloth each time.  Dress in clean clothes/sleepwear after showering.  See instructions below for application.          Do not apply any lotions or moisturizers.       Do not shave the area to be prepped for at least 2 days prior to surgery.    Clipping the hair may be done immediately prior to your surgery at the hospital    if needed.    Directions:  Thoroughly rinse your body with water.  Apply 4% CHG to a cloth and wash skin gently, paying special attention to the operative site.  Rinse again thoroughly.  Once you  have begun using this product do not apply anything else to your skin. If itching or redness persists, rinse affected areas and discontinue use.    When using this product:  • Keep out of eyes, ears, and mouth.  • If solution should contact these areas, rinse out promptly and thoroughly with water.  • For external use only.  • Do not use in genital area, on your face or head.      PATIENT/FAMILY/RESPONSIBLE PARTY VERBALIZES UNDERSTANDING OF ABOVE EDUCATION

## 2017-03-31 ENCOUNTER — ANESTHESIA EVENT (OUTPATIENT)
Dept: PERIOP | Facility: HOSPITAL | Age: 57
End: 2017-03-31

## 2017-03-31 ENCOUNTER — ANESTHESIA (OUTPATIENT)
Dept: PERIOP | Facility: HOSPITAL | Age: 57
End: 2017-03-31

## 2017-03-31 ENCOUNTER — HOSPITAL ENCOUNTER (OUTPATIENT)
Facility: HOSPITAL | Age: 57
Setting detail: HOSPITAL OUTPATIENT SURGERY
Discharge: HOME | End: 2017-03-31
Attending: SPECIALIST | Admitting: SPECIALIST

## 2017-03-31 VITALS
TEMPERATURE: 97.3 F | OXYGEN SATURATION: 90 % | DIASTOLIC BLOOD PRESSURE: 80 MMHG | HEART RATE: 52 BPM | SYSTOLIC BLOOD PRESSURE: 119 MMHG | RESPIRATION RATE: 16 BRPM

## 2017-03-31 PROCEDURE — 25010000002 HYDROMORPHONE PER 4 MG: Performed by: ANESTHESIOLOGY

## 2017-03-31 PROCEDURE — C1781 MESH (IMPLANTABLE): HCPCS | Performed by: SPECIALIST

## 2017-03-31 PROCEDURE — 25010000002 MIDAZOLAM PER 1 MG: Performed by: ANESTHESIOLOGY

## 2017-03-31 PROCEDURE — 25010000002 PROPOFOL 10 MG/ML EMULSION: Performed by: NURSE ANESTHETIST, CERTIFIED REGISTERED

## 2017-03-31 PROCEDURE — 25010000002 ONDANSETRON PER 1 MG: Performed by: NURSE ANESTHETIST, CERTIFIED REGISTERED

## 2017-03-31 PROCEDURE — 25010000002 FENTANYL CITRATE (PF) 250 MCG/5ML SOLUTION: Performed by: NURSE ANESTHETIST, CERTIFIED REGISTERED

## 2017-03-31 PROCEDURE — 25010000002 VANCOMYCIN PER 500 MG: Performed by: SPECIALIST

## 2017-03-31 PROCEDURE — 25010000002 SUCCINYLCHOLINE PER 20 MG: Performed by: NURSE ANESTHETIST, CERTIFIED REGISTERED

## 2017-03-31 PROCEDURE — 25010000002 KETOROLAC TROMETHAMINE PER 15 MG: Performed by: NURSE ANESTHETIST, CERTIFIED REGISTERED

## 2017-03-31 DEVICE — BARD MESH PERFIX PLUG, EXTRA LARGE
Type: IMPLANTABLE DEVICE | Status: FUNCTIONAL
Brand: BARD MESH PERFIX PLUG

## 2017-03-31 RX ORDER — MIDAZOLAM HYDROCHLORIDE 1 MG/ML
1 INJECTION INTRAMUSCULAR; INTRAVENOUS
Status: DISCONTINUED | OUTPATIENT
Start: 2017-03-31 | End: 2017-03-31 | Stop reason: HOSPADM

## 2017-03-31 RX ORDER — MORPHINE SULFATE 2 MG/ML
2 INJECTION, SOLUTION INTRAMUSCULAR; INTRAVENOUS AS NEEDED
Status: DISCONTINUED | OUTPATIENT
Start: 2017-03-31 | End: 2017-03-31 | Stop reason: HOSPADM

## 2017-03-31 RX ORDER — ACETAMINOPHEN 10 MG/ML
1000 INJECTION, SOLUTION INTRAVENOUS ONCE
Status: COMPLETED | OUTPATIENT
Start: 2017-03-31 | End: 2017-03-31

## 2017-03-31 RX ORDER — ONDANSETRON 2 MG/ML
4 INJECTION INTRAMUSCULAR; INTRAVENOUS AS NEEDED
Status: DISCONTINUED | OUTPATIENT
Start: 2017-03-31 | End: 2017-03-31 | Stop reason: HOSPADM

## 2017-03-31 RX ORDER — METOPROLOL TARTRATE 5 MG/5ML
INJECTION INTRAVENOUS AS NEEDED
Status: DISCONTINUED | OUTPATIENT
Start: 2017-03-31 | End: 2017-03-31 | Stop reason: SURG

## 2017-03-31 RX ORDER — LABETALOL HYDROCHLORIDE 5 MG/ML
5 INJECTION, SOLUTION INTRAVENOUS
Status: DISCONTINUED | OUTPATIENT
Start: 2017-03-31 | End: 2017-03-31 | Stop reason: HOSPADM

## 2017-03-31 RX ORDER — NALOXONE HCL 0.4 MG/ML
0.04 VIAL (ML) INJECTION AS NEEDED
Status: DISCONTINUED | OUTPATIENT
Start: 2017-03-31 | End: 2017-03-31 | Stop reason: HOSPADM

## 2017-03-31 RX ORDER — LIDOCAINE HYDROCHLORIDE 20 MG/ML
INJECTION, SOLUTION INFILTRATION; PERINEURAL AS NEEDED
Status: DISCONTINUED | OUTPATIENT
Start: 2017-03-31 | End: 2017-03-31

## 2017-03-31 RX ORDER — FLUMAZENIL 0.1 MG/ML
0.2 INJECTION INTRAVENOUS AS NEEDED
Status: DISCONTINUED | OUTPATIENT
Start: 2017-03-31 | End: 2017-03-31 | Stop reason: HOSPADM

## 2017-03-31 RX ORDER — OXYCODONE AND ACETAMINOPHEN 7.5; 325 MG/1; MG/1
1 TABLET ORAL EVERY 4 HOURS PRN
Status: DISCONTINUED | OUTPATIENT
Start: 2017-03-31 | End: 2017-03-31 | Stop reason: HOSPADM

## 2017-03-31 RX ORDER — PROPOFOL 10 MG/ML
VIAL (ML) INTRAVENOUS AS NEEDED
Status: DISCONTINUED | OUTPATIENT
Start: 2017-03-31 | End: 2017-03-31 | Stop reason: SURG

## 2017-03-31 RX ORDER — SUCCINYLCHOLINE CHLORIDE 20 MG/ML
INJECTION INTRAMUSCULAR; INTRAVENOUS AS NEEDED
Status: DISCONTINUED | OUTPATIENT
Start: 2017-03-31 | End: 2017-03-31

## 2017-03-31 RX ORDER — KETOROLAC TROMETHAMINE 30 MG/ML
INJECTION, SOLUTION INTRAMUSCULAR; INTRAVENOUS AS NEEDED
Status: DISCONTINUED | OUTPATIENT
Start: 2017-03-31 | End: 2017-03-31 | Stop reason: SURG

## 2017-03-31 RX ORDER — FENTANYL CITRATE 50 UG/ML
INJECTION, SOLUTION INTRAMUSCULAR; INTRAVENOUS AS NEEDED
Status: DISCONTINUED | OUTPATIENT
Start: 2017-03-31 | End: 2017-03-31 | Stop reason: SURG

## 2017-03-31 RX ORDER — METOCLOPRAMIDE HYDROCHLORIDE 5 MG/ML
5 INJECTION INTRAMUSCULAR; INTRAVENOUS
Status: DISCONTINUED | OUTPATIENT
Start: 2017-03-31 | End: 2017-03-31 | Stop reason: HOSPADM

## 2017-03-31 RX ORDER — MEPERIDINE HYDROCHLORIDE 25 MG/ML
12.5 INJECTION INTRAMUSCULAR; INTRAVENOUS; SUBCUTANEOUS
Status: DISCONTINUED | OUTPATIENT
Start: 2017-03-31 | End: 2017-03-31 | Stop reason: HOSPADM

## 2017-03-31 RX ORDER — ROCURONIUM BROMIDE 10 MG/ML
INJECTION, SOLUTION INTRAVENOUS AS NEEDED
Status: DISCONTINUED | OUTPATIENT
Start: 2017-03-31 | End: 2017-03-31

## 2017-03-31 RX ORDER — HYDRALAZINE HYDROCHLORIDE 20 MG/ML
5 INJECTION INTRAMUSCULAR; INTRAVENOUS
Status: DISCONTINUED | OUTPATIENT
Start: 2017-03-31 | End: 2017-03-31 | Stop reason: HOSPADM

## 2017-03-31 RX ORDER — MIDAZOLAM HYDROCHLORIDE 1 MG/ML
2 INJECTION INTRAMUSCULAR; INTRAVENOUS
Status: DISCONTINUED | OUTPATIENT
Start: 2017-03-31 | End: 2017-03-31 | Stop reason: HOSPADM

## 2017-03-31 RX ORDER — FAMOTIDINE 10 MG/ML
20 INJECTION, SOLUTION INTRAVENOUS
Status: DISCONTINUED | OUTPATIENT
Start: 2017-03-31 | End: 2017-03-31 | Stop reason: HOSPADM

## 2017-03-31 RX ORDER — ROCURONIUM BROMIDE 10 MG/ML
INJECTION, SOLUTION INTRAVENOUS AS NEEDED
Status: DISCONTINUED | OUTPATIENT
Start: 2017-03-31 | End: 2017-03-31 | Stop reason: SURG

## 2017-03-31 RX ORDER — ONDANSETRON 2 MG/ML
INJECTION INTRAMUSCULAR; INTRAVENOUS AS NEEDED
Status: DISCONTINUED | OUTPATIENT
Start: 2017-03-31 | End: 2017-03-31 | Stop reason: SURG

## 2017-03-31 RX ORDER — PHENYLEPHRINE HCL IN 0.9% NACL 0.8MG/10ML
SYRINGE (ML) INTRAVENOUS AS NEEDED
Status: DISCONTINUED | OUTPATIENT
Start: 2017-03-31 | End: 2017-03-31 | Stop reason: SURG

## 2017-03-31 RX ORDER — IPRATROPIUM BROMIDE AND ALBUTEROL SULFATE 2.5; .5 MG/3ML; MG/3ML
3 SOLUTION RESPIRATORY (INHALATION) ONCE AS NEEDED
Status: DISCONTINUED | OUTPATIENT
Start: 2017-03-31 | End: 2017-03-31 | Stop reason: HOSPADM

## 2017-03-31 RX ORDER — HYDROMORPHONE HCL 110MG/55ML
PATIENT CONTROLLED ANALGESIA SYRINGE INTRAVENOUS AS NEEDED
Status: DISCONTINUED | OUTPATIENT
Start: 2017-03-31 | End: 2017-03-31

## 2017-03-31 RX ORDER — SUCCINYLCHOLINE CHLORIDE 20 MG/ML
INJECTION INTRAMUSCULAR; INTRAVENOUS AS NEEDED
Status: DISCONTINUED | OUTPATIENT
Start: 2017-03-31 | End: 2017-03-31 | Stop reason: SURG

## 2017-03-31 RX ORDER — SODIUM CHLORIDE, SODIUM LACTATE, POTASSIUM CHLORIDE, CALCIUM CHLORIDE 600; 310; 30; 20 MG/100ML; MG/100ML; MG/100ML; MG/100ML
100 INJECTION, SOLUTION INTRAVENOUS CONTINUOUS
Status: DISCONTINUED | OUTPATIENT
Start: 2017-03-31 | End: 2017-03-31 | Stop reason: HOSPADM

## 2017-03-31 RX ORDER — LIDOCAINE HYDROCHLORIDE 20 MG/ML
INJECTION, SOLUTION INFILTRATION; PERINEURAL AS NEEDED
Status: DISCONTINUED | OUTPATIENT
Start: 2017-03-31 | End: 2017-03-31 | Stop reason: SURG

## 2017-03-31 RX ORDER — PROPOFOL 10 MG/ML
VIAL (ML) INTRAVENOUS AS NEEDED
Status: DISCONTINUED | OUTPATIENT
Start: 2017-03-31 | End: 2017-03-31

## 2017-03-31 RX ORDER — FENTANYL CITRATE 50 UG/ML
INJECTION, SOLUTION INTRAMUSCULAR; INTRAVENOUS AS NEEDED
Status: DISCONTINUED | OUTPATIENT
Start: 2017-03-31 | End: 2017-03-31

## 2017-03-31 RX ORDER — SODIUM CHLORIDE 0.9 % (FLUSH) 0.9 %
1-10 SYRINGE (ML) INJECTION AS NEEDED
Status: DISCONTINUED | OUTPATIENT
Start: 2017-03-31 | End: 2017-03-31 | Stop reason: HOSPADM

## 2017-03-31 RX ORDER — BUPIVACAINE HYDROCHLORIDE AND EPINEPHRINE 2.5; 5 MG/ML; UG/ML
INJECTION, SOLUTION INFILTRATION; PERINEURAL AS NEEDED
Status: DISCONTINUED | OUTPATIENT
Start: 2017-03-31 | End: 2017-03-31 | Stop reason: HOSPADM

## 2017-03-31 RX ADMIN — METOPROLOL TARTRATE 2.5 MG: 5 INJECTION INTRAVENOUS at 10:27

## 2017-03-31 RX ADMIN — PROPOFOL 170 MG: 10 INJECTION, EMULSION INTRAVENOUS at 09:46

## 2017-03-31 RX ADMIN — OXYCODONE HYDROCHLORIDE AND ACETAMINOPHEN 1 TABLET: 7.5; 325 TABLET ORAL at 12:59

## 2017-03-31 RX ADMIN — ONDANSETRON HYDROCHLORIDE 4 MG: 2 SOLUTION INTRAMUSCULAR; INTRAVENOUS at 10:45

## 2017-03-31 RX ADMIN — FAMOTIDINE 20 MG: 10 INJECTION, SOLUTION INTRAVENOUS at 08:59

## 2017-03-31 RX ADMIN — KETOROLAC TROMETHAMINE 30 MG: 30 INJECTION, SOLUTION INTRAMUSCULAR at 10:58

## 2017-03-31 RX ADMIN — Medication 160 MCG: at 09:59

## 2017-03-31 RX ADMIN — SUCCINYLCHOLINE CHLORIDE 120 MG: 20 INJECTION, SOLUTION INTRAMUSCULAR; INTRAVENOUS at 09:46

## 2017-03-31 RX ADMIN — FENTANYL CITRATE 50 MCG: 50 INJECTION INTRAMUSCULAR; INTRAVENOUS at 10:10

## 2017-03-31 RX ADMIN — KETOROLAC TROMETHAMINE 30 MG: 30 INJECTION, SOLUTION INTRAMUSCULAR at 10:57

## 2017-03-31 RX ADMIN — METOPROLOL TARTRATE 2.5 MG: 5 INJECTION INTRAVENOUS at 10:34

## 2017-03-31 RX ADMIN — ACETAMINOPHEN 1000 MG: 10 INJECTION, SOLUTION INTRAVENOUS at 11:33

## 2017-03-31 RX ADMIN — SODIUM CHLORIDE, POTASSIUM CHLORIDE, SODIUM LACTATE AND CALCIUM CHLORIDE 100 ML/HR: 600; 310; 30; 20 INJECTION, SOLUTION INTRAVENOUS at 08:59

## 2017-03-31 RX ADMIN — CEFAZOLIN 1 G: 1 INJECTION, POWDER, FOR SOLUTION INTRAMUSCULAR; INTRAVENOUS; PARENTERAL at 09:59

## 2017-03-31 RX ADMIN — MIDAZOLAM HYDROCHLORIDE 2 MG: 1 INJECTION, SOLUTION INTRAMUSCULAR; INTRAVENOUS at 08:59

## 2017-03-31 RX ADMIN — FENTANYL CITRATE 100 MCG: 50 INJECTION INTRAMUSCULAR; INTRAVENOUS at 09:46

## 2017-03-31 RX ADMIN — LIDOCAINE HYDROCHLORIDE 0.5 ML: 10 INJECTION, SOLUTION EPIDURAL; INFILTRATION; INTRACAUDAL; PERINEURAL at 08:59

## 2017-03-31 RX ADMIN — ROCURONIUM BROMIDE 5 MG: 10 INJECTION INTRAVENOUS at 09:46

## 2017-03-31 RX ADMIN — HYDROMORPHONE HYDROCHLORIDE 1 MG: 1 INJECTION, SOLUTION INTRAMUSCULAR; INTRAVENOUS; SUBCUTANEOUS at 11:54

## 2017-03-31 RX ADMIN — FENTANYL CITRATE 100 MCG: 50 INJECTION INTRAMUSCULAR; INTRAVENOUS at 10:20

## 2017-03-31 RX ADMIN — ACETAMINOPHEN 1000 MG: 10 INJECTION, SOLUTION INTRAVENOUS at 08:59

## 2017-03-31 RX ADMIN — LIDOCAINE HYDROCHLORIDE 100 MG: 20 INJECTION, SOLUTION INFILTRATION; PERINEURAL at 09:59

## 2017-03-31 NOTE — ANESTHESIA PROCEDURE NOTES
Airway  Airway not difficult    General Information and Staff    Patient location during procedure: OR  CRNA: BALDEMAR OCAMPO    Indications and Patient Condition  Indications for airway management: airway protection    Preoxygenated: yes  Mask difficulty assessment: 1 - vent by mask    Final Airway Details  Final airway type: endotracheal airway      Successful airway: ETT  Cuffed: yes   Successful intubation technique: direct laryngoscopy  Facilitating devices/methods: intubating stylet  Endotracheal tube insertion site: oral  Blade: Sanchez  Blade size: #2  ETT size: 7.5 mm  Cormack-Lehane Classification: grade IIa - partial view of glottis  Placement verified by: chest auscultation and capnometry   Cuff volume (mL): 7  Measured from: lips  Number of attempts at approach: 1    Additional Comments  Atraumatic intubation

## 2017-03-31 NOTE — ANESTHESIA PREPROCEDURE EVALUATION
Anesthesia Evaluation     Patient summary reviewed and Nursing notes reviewed   no history of anesthetic complications:  NPO Status: > 8 hours   Airway   Mallampati: I  TM distance: >3 FB  Neck ROM: full  no difficulty expected  Dental      Pulmonary     breath sounds clear to auscultation  (+) a smoker Current,   (-) sleep apnea  Cardiovascular   Exercise tolerance: poor (<4 METS)    ECG reviewed  Rhythm: regular  Rate: normal    (+) hypertension,       Neuro/Psych- negative ROS  (-) seizures, TIA, CVA  GI/Hepatic/Renal/Endo - negative ROS   (-) liver disease, renal disease, diabetes    Musculoskeletal     (+) back pain,   Abdominal    Substance History - negative use     OB/GYN negative ob/gyn ROS         Other                                    Anesthesia Plan    ASA 2     general     intravenous induction   Anesthetic plan and risks discussed with patient.

## 2017-03-31 NOTE — ANESTHESIA POSTPROCEDURE EVALUATION
Patient: Favian Galeana    Procedure Summary     Date Anesthesia Start Anesthesia Stop Room / Location    03/31/17 0942 1102 [facility] OR 05 / [facility] OR       Procedure Diagnosis Surgeon Provider    LEFT INGUINAL HERNIA REPAIR WITH MESH (Left Abdomen) (INGUINAL HERNIA LEFT) MD Jameel Bhatia CRNA          Anesthesia Type: general  Last vitals  /87 (03/31/17 1115)    Temp 97 °F (36.1 °C) (03/31/17 1100)    Pulse 69 (03/31/17 1115)   Resp 12 (03/31/17 1115)    SpO2 97 % (03/31/17 1115)      Post Anesthesia Care and Evaluation    Patient location during evaluation: PACU  Patient participation: complete - patient participated  Level of consciousness: awake and alert  Pain management: adequate  Airway patency: patent  Anesthetic complications: No anesthetic complications    Cardiovascular status: acceptable and hemodynamically stable  Respiratory status: acceptable  Hydration status: acceptable

## 2017-06-01 ENCOUNTER — TRANSCRIBE ORDERS (OUTPATIENT)
Dept: ADMINISTRATIVE | Facility: HOSPITAL | Age: 57
End: 2017-06-01

## 2017-06-01 DIAGNOSIS — M51.37 DEGENERATION OF LUMBAR OR LUMBOSACRAL INTERVERTEBRAL DISC: ICD-10-CM

## 2017-06-01 DIAGNOSIS — M51.17 INTERVERTEBRAL DISC DISORDERS WITH RADICULOPATHY, LUMBOSACRAL REGION: ICD-10-CM

## 2017-06-01 DIAGNOSIS — M51.36 LUMBAR DEGENERATIVE DISC DISEASE: ICD-10-CM

## 2017-06-01 DIAGNOSIS — M96.1 LUMBAR POSTLAMINECTOMY SYNDROME: ICD-10-CM

## 2017-06-01 DIAGNOSIS — M51.16 INTERVERTEBRAL DISC DISORDERS WITH RADICULOPATHY, LUMBAR REGION: Primary | ICD-10-CM

## 2017-06-07 ENCOUNTER — OFFICE VISIT (OUTPATIENT)
Dept: PODIATRY | Facility: CLINIC | Age: 57
End: 2017-06-07

## 2017-06-07 VITALS
HEIGHT: 71 IN | DIASTOLIC BLOOD PRESSURE: 90 MMHG | BODY MASS INDEX: 28.28 KG/M2 | WEIGHT: 202 LBS | HEART RATE: 98 BPM | SYSTOLIC BLOOD PRESSURE: 132 MMHG

## 2017-06-07 DIAGNOSIS — M79.671 FOOT PAIN, RIGHT: ICD-10-CM

## 2017-06-07 DIAGNOSIS — Z72.0 TOBACCO ABUSE: ICD-10-CM

## 2017-06-07 DIAGNOSIS — L60.0 INGROWN TOENAIL: Primary | ICD-10-CM

## 2017-06-07 DIAGNOSIS — L84 FOOT CALLUS: ICD-10-CM

## 2017-06-07 PROCEDURE — 11720 DEBRIDE NAIL 1-5: CPT | Performed by: PODIATRIST

## 2017-06-07 PROCEDURE — 11055 PARING/CUTG B9 HYPRKER LES 1: CPT | Performed by: PODIATRIST

## 2017-06-07 PROCEDURE — 99213 OFFICE O/P EST LOW 20 MIN: CPT | Performed by: PODIATRIST
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
## 2017-06-07 NOTE — PATIENT INSTRUCTIONS
Ingrown Toenail  An ingrown toenail occurs when the corner or sides of your toenail grow into the surrounding skin. The big toe is most commonly affected, but it can happen to any of your toes. If your ingrown toenail is not treated, you will be at risk for infection.  CAUSES  This condition may be caused by:  · Wearing shoes that are too small or tight.  · Injury or trauma, such as stubbing your toe or having your toe stepped on.  · Improper cutting or care of your toenails.  · Being born with (congenital) nail or foot abnormalities, such as having a nail that is too big for your toe.  RISK FACTORS  Risk factors for an ingrown toenail include:  · Age. Your nails tend to thicken as you get older, so ingrown nails are more common in older people.  · Diabetes.  · Cutting your toenails incorrectly.  · Blood circulation problems.  SYMPTOMS  Symptoms may include:  · Pain, soreness, or tenderness.  · Redness.  · Swelling.  · Hardening of the skin surrounding the toe.  Your ingrown toenail may be infected if there is fluid, pus, or drainage.  DIAGNOSIS   An ingrown toenail may be diagnosed by medical history and physical exam. If your toenail is infected, your health care provider may test a sample of the drainage.  TREATMENT  Treatment depends on the severity of your ingrown toenail. Some ingrown toenails may be treated at home. More severe or infected ingrown toenails may require surgery to remove all or part of the nail. Infected ingrown toenails may also be treated with antibiotic medicines.  HOME CARE INSTRUCTIONS  · If you were prescribed an antibiotic medicine, finish all of it even if you start to feel better.  · Soak your foot in warm soapy water for 20 minutes, 3 times per day or as directed by your health care provider.  · Carefully lift the edge of the nail away from the sore skin by wedging a small piece of cotton under the corner of the nail. This may help with the pain.  Be careful not to cause more injury  to the area.  · Wear shoes that fit well. If your ingrown toenail is causing you pain, try wearing sandals, if possible.  · Trim your toenails regularly and carefully. Do not cut them in a curved shape. Cut your toenails straight across. This prevents injury to the skin at the corners of the toenail.  · Keep your feet clean and dry.  · If you are having trouble walking and are given crutches by your health care provider, use them as directed.  · Do not pick at your toenail or try to remove it yourself.  · Take medicines only as directed by your health care provider.  · Keep all follow-up visits as directed by your health care provider. This is important.  SEEK MEDICAL CARE IF:  · Your symptoms do not improve with treatment.  SEEK IMMEDIATE MEDICAL CARE IF:  · You have red streaks that start at your foot and go up your leg.  · You have a fever.  · You have increased redness, swelling, or pain.  · You have fluid, blood, or pus coming from your toenail.     This information is not intended to replace advice given to you by your health care provider. Make sure you discuss any questions you have with your health care provider.     Document Released: 12/15/2001 Document Revised: 05/03/2016 Document Reviewed: 11/11/2015  [email or web address] Interactive Patient Education ©2017 [email or web address]

## 2017-06-11 DIAGNOSIS — G62.9 NEUROPATHY: ICD-10-CM

## 2017-06-12 RX ORDER — GABAPENTIN 300 MG/1
CAPSULE ORAL
Qty: 270 CAPSULE | Refills: 0 | OUTPATIENT
Start: 2017-06-12

## 2017-06-15 ENCOUNTER — HOSPITAL ENCOUNTER (OUTPATIENT)
Dept: MRI IMAGING | Facility: HOSPITAL | Age: 57
Discharge: HOME | End: 2017-06-15
Attending: PAIN MEDICINE | Admitting: PAIN MEDICINE

## 2017-06-15 DIAGNOSIS — M51.16 INTERVERTEBRAL DISC DISORDERS WITH RADICULOPATHY, LUMBAR REGION: ICD-10-CM

## 2017-06-15 DIAGNOSIS — M96.1 LUMBAR POSTLAMINECTOMY SYNDROME: ICD-10-CM

## 2017-06-15 DIAGNOSIS — M51.37 DEGENERATION OF LUMBAR OR LUMBOSACRAL INTERVERTEBRAL DISC: ICD-10-CM

## 2017-06-15 DIAGNOSIS — M51.36 LUMBAR DEGENERATIVE DISC DISEASE: ICD-10-CM

## 2017-06-15 DIAGNOSIS — M51.17 INTERVERTEBRAL DISC DISORDERS WITH RADICULOPATHY, LUMBOSACRAL REGION: ICD-10-CM

## 2017-06-15 PROCEDURE — 82565 ASSAY OF CREATININE: CPT

## 2017-06-15 PROCEDURE — 72158 MRI LUMBAR SPINE W/O & W/DYE: CPT

## 2017-06-15 PROCEDURE — A9577 INJ MULTIHANCE: HCPCS | Performed by: PAIN MEDICINE

## 2017-06-15 PROCEDURE — 0 GADOBENATE DIMEGLUMINE 529 MG/ML SOLUTION: Performed by: PAIN MEDICINE

## 2017-06-15 RX ADMIN — GADOBENATE DIMEGLUMINE 20 ML: 529 INJECTION, SOLUTION INTRAVENOUS at 09:00

## 2017-06-16 LAB — CREAT BLDA-MCNC: 1.6 MG/DL (ref 0.6–1.3)

## 2017-07-17 DIAGNOSIS — G62.9 NEUROPATHY: ICD-10-CM

## 2017-07-17 RX ORDER — GABAPENTIN 300 MG/1
900 CAPSULE ORAL 3 TIMES DAILY
Qty: 270 CAPSULE | Refills: 5 | Status: SHIPPED | OUTPATIENT
Start: 2017-07-17

## 2017-07-22 ENCOUNTER — APPOINTMENT (OUTPATIENT)
Dept: GENERAL RADIOLOGY | Age: 57
End: 2017-07-22
Payer: MEDICARE

## 2017-07-22 ENCOUNTER — APPOINTMENT (OUTPATIENT)
Dept: GENERAL RADIOLOGY | Facility: HOSPITAL | Age: 57
End: 2017-07-22

## 2017-07-22 ENCOUNTER — HOSPITAL ENCOUNTER (EMERGENCY)
Age: 57
Discharge: HOME | End: 2017-07-22
Payer: MEDICARE

## 2017-07-22 ENCOUNTER — HOSPITAL ENCOUNTER (EMERGENCY)
Facility: HOSPITAL | Age: 57
Discharge: HOME | End: 2017-07-22
Admitting: EMERGENCY MEDICINE

## 2017-07-22 VITALS
TEMPERATURE: 98.6 F | HEART RATE: 81 BPM | HEIGHT: 71 IN | RESPIRATION RATE: 17 BRPM | SYSTOLIC BLOOD PRESSURE: 155 MMHG | BODY MASS INDEX: 27.46 KG/M2 | OXYGEN SATURATION: 99 % | WEIGHT: 196.13 LBS | DIASTOLIC BLOOD PRESSURE: 89 MMHG

## 2017-07-22 VITALS
OXYGEN SATURATION: 98 % | BODY MASS INDEX: 27.44 KG/M2 | DIASTOLIC BLOOD PRESSURE: 99 MMHG | RESPIRATION RATE: 18 BRPM | HEIGHT: 71 IN | SYSTOLIC BLOOD PRESSURE: 149 MMHG | TEMPERATURE: 97.1 F | WEIGHT: 196 LBS | HEART RATE: 76 BPM

## 2017-07-22 DIAGNOSIS — M25.561 ACUTE PAIN OF RIGHT KNEE: ICD-10-CM

## 2017-07-22 DIAGNOSIS — S76.011A HIP STRAIN, RIGHT, INITIAL ENCOUNTER: ICD-10-CM

## 2017-07-22 DIAGNOSIS — M25.551 PAIN OF RIGHT HIP JOINT: ICD-10-CM

## 2017-07-22 DIAGNOSIS — M16.0 OSTEOARTHRITIS OF BOTH HIPS, UNSPECIFIED OSTEOARTHRITIS TYPE: ICD-10-CM

## 2017-07-22 DIAGNOSIS — S86.911A KNEE STRAIN, RIGHT, INITIAL ENCOUNTER: Primary | ICD-10-CM
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
## 2017-07-22 PROCEDURE — 99283 EMERGENCY DEPT VISIT LOW MDM: CPT

## 2017-07-22 PROCEDURE — 73560 X-RAY EXAM OF KNEE 1 OR 2: CPT

## 2017-07-22 PROCEDURE — 99283 EMERGENCY DEPT VISIT LOW MDM: CPT | Performed by: PHYSICIAN ASSISTANT

## 2017-07-22 PROCEDURE — 73502 X-RAY EXAM HIP UNI 2-3 VIEWS: CPT

## 2017-07-22 PROCEDURE — 73564 X-RAY EXAM KNEE 4 OR MORE: CPT

## 2017-07-22 PROCEDURE — 73503 X-RAY EXAM HIP UNI 4/> VIEWS: CPT

## 2017-07-22 RX ORDER — CYCLOBENZAPRINE HCL 10 MG
10 TABLET ORAL 3 TIMES DAILY PRN
Qty: 15 TABLET | Refills: 0 | Status: SHIPPED | OUTPATIENT
Start: 2017-07-22

## 2017-07-22 RX ORDER — AMLODIPINE BESYLATE 10 MG/1
10 TABLET ORAL DAILY
COMMUNITY

## 2017-07-22 RX ORDER — MELOXICAM 7.5 MG/1
7.5 TABLET ORAL DAILY
Qty: 14 TABLET | Refills: 0 | Status: SHIPPED | OUTPATIENT
Start: 2017-07-22

## 2017-07-22 RX ORDER — HYDROCODONE BITARTRATE AND ACETAMINOPHEN 5; 325 MG/1; MG/1
1 TABLET ORAL EVERY 6 HOURS PRN
Qty: 10 TABLET | Refills: 0 | Status: SHIPPED | OUTPATIENT
Start: 2017-07-22 | End: 2017-07-22

## 2017-07-22 RX ORDER — HYDROCODONE BITARTRATE AND ACETAMINOPHEN 5; 325 MG/1; MG/1
1 TABLET ORAL EVERY 6 HOURS PRN
Qty: 10 TABLET | Refills: 0 | Status: SHIPPED | OUTPATIENT
Start: 2017-07-22

## 2017-07-22 RX ORDER — OMEPRAZOLE 40 MG/1
40 CAPSULE, DELAYED RELEASE ORAL DAILY
COMMUNITY

## 2017-07-22 RX ORDER — ONDANSETRON 4 MG/1
4 TABLET, ORALLY DISINTEGRATING ORAL EVERY 8 HOURS PRN
Qty: 15 TABLET | Refills: 0 | Status: SHIPPED | OUTPATIENT
Start: 2017-07-22

## 2017-07-22 ASSESSMENT — ENCOUNTER SYMPTOMS
ABDOMINAL PAIN: 0
SHORTNESS OF BREATH: 0
COUGH: 0
BACK PAIN: 0
NAUSEA: 0
WHEEZING: 0
CONSTIPATION: 0
DIARRHEA: 0
VOMITING: 0

## 2017-07-22 ASSESSMENT — PAIN SCALES - GENERAL: PAINLEVEL_OUTOF10: 10
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
## 2017-07-24 ENCOUNTER — LAB (OUTPATIENT)
Dept: LAB | Facility: HOSPITAL | Age: 57
End: 2017-07-24
Attending: INTERNAL MEDICINE

## 2017-07-24 ENCOUNTER — OFFICE VISIT (OUTPATIENT)
Dept: INTERNAL MEDICINE | Facility: CLINIC | Age: 57
End: 2017-07-24

## 2017-07-24 VITALS
WEIGHT: 198.1 LBS | BODY MASS INDEX: 27.73 KG/M2 | OXYGEN SATURATION: 98 % | SYSTOLIC BLOOD PRESSURE: 138 MMHG | DIASTOLIC BLOOD PRESSURE: 78 MMHG | HEART RATE: 137 BPM | RESPIRATION RATE: 16 BRPM | HEIGHT: 71 IN

## 2017-07-24 DIAGNOSIS — I10 ESSENTIAL HYPERTENSION: ICD-10-CM

## 2017-07-24 DIAGNOSIS — E78.2 MIXED HYPERLIPIDEMIA: ICD-10-CM

## 2017-07-24 DIAGNOSIS — Z00.00 ENCOUNTER FOR PREVENTIVE HEALTH EXAMINATION: ICD-10-CM

## 2017-07-24 DIAGNOSIS — N52.9 VASCULOGENIC ERECTILE DYSFUNCTION, UNSPECIFIED VASCULOGENIC ERECTILE DYSFUNCTION TYPE: ICD-10-CM

## 2017-07-24 DIAGNOSIS — M16.11 PRIMARY OSTEOARTHRITIS OF RIGHT HIP: Primary | ICD-10-CM

## 2017-07-24 DIAGNOSIS — Z72.0 TOBACCO ABUSE: ICD-10-CM

## 2017-07-24 PROBLEM — E66.3 OVERWEIGHT: Status: ACTIVE | Noted: 2017-07-24

## 2017-07-24 LAB
ANION GAP SERPL CALCULATED.3IONS-SCNC: 15 MMOL/L (ref 4–13)
ARTICHOKE IGE QN: 102 MG/DL (ref 0–99)
BUN BLD-MCNC: 13 MG/DL (ref 5–21)
BUN/CREAT SERPL: 9.9 (ref 7–25)
CALCIUM SPEC-SCNC: 9.5 MG/DL (ref 8.4–10.4)
CHLORIDE SERPL-SCNC: 109 MMOL/L (ref 98–110)
CHOLEST SERPL-MCNC: 192 MG/DL (ref 130–200)
CO2 SERPL-SCNC: 20 MMOL/L (ref 24–31)
CREAT BLD-MCNC: 1.31 MG/DL (ref 0.5–1.4)
GFR SERPL CREATININE-BSD FRML MDRD: 69 ML/MIN/1.73
GLUCOSE BLD-MCNC: 107 MG/DL (ref 70–100)
HCV AB SER DONR QL: REACTIVE
HCV S/C RATIO: 25.6 (ref 0–0.99)
HDLC SERPL-MCNC: 45 MG/DL
LDLC/HDLC SERPL: 2.78 {RATIO}
POTASSIUM BLD-SCNC: 3.8 MMOL/L (ref 3.5–5.3)
SODIUM BLD-SCNC: 144 MMOL/L (ref 135–145)
TRIGL SERPL-MCNC: 109 MG/DL (ref 0–149)

## 2017-07-24 PROCEDURE — 80061 LIPID PANEL: CPT | Performed by: INTERNAL MEDICINE

## 2017-07-24 PROCEDURE — 99214 OFFICE O/P EST MOD 30 MIN: CPT | Performed by: INTERNAL MEDICINE

## 2017-07-24 PROCEDURE — G0438 PPPS, INITIAL VISIT: HCPCS | Performed by: INTERNAL MEDICINE

## 2017-07-24 PROCEDURE — 80048 BASIC METABOLIC PNL TOTAL CA: CPT | Performed by: INTERNAL MEDICINE

## 2017-07-24 PROCEDURE — 86803 HEPATITIS C AB TEST: CPT | Performed by: INTERNAL MEDICINE

## 2017-07-24 PROCEDURE — 36415 COLL VENOUS BLD VENIPUNCTURE: CPT

## 2017-07-24 RX ORDER — SILDENAFIL CITRATE 20 MG/1
TABLET ORAL
Qty: 30 TABLET | Refills: 3 | Status: SHIPPED | OUTPATIENT
Start: 2017-07-24

## 2017-07-24 RX ORDER — CYCLOBENZAPRINE HCL 10 MG
1 TABLET ORAL 3 TIMES DAILY
Refills: 0 | COMMUNITY
Start: 2017-07-22

## 2017-07-24 NOTE — PROGRESS NOTES
QUICK REFERENCE INFORMATION:  The ABCs of the Annual Wellness Visit    Initial Medicare Wellness Visit    HEALTH RISK ASSESSMENT    1960    Recent Hospitalizations:  No hospitalization(s) within the last year..        Current Medical Providers:  Patient Care Team:  Jenaro Riley DO as PCP - General (Family Medicine)  Chica Prieto MD as PCP - Claims Attributed  Jim Lopez MD as Anesthesiologist (Pain Medicine)  Gio Garcia DPM as Consulting Physician (Podiatry)        Smoking Status:  History   Smoking Status   • Current Every Day Smoker   • Packs/day: 0.50   Smokeless Tobacco   • Never Used     Comment: trying to quit       Alcohol Consumption:  History   Alcohol Use   • Yes     Comment: one pint of gin per month       Depression Screen:   PHQ-9 Depression Screening 7/24/2017   Little interest or pleasure in doing things 0   Feeling down, depressed, or hopeless 0   PHQ-9 Total Score 0       Health Habits and Functional and Cognitive Screening:  Functional & Cognitive Status 7/24/2017   Do you have difficulty preparing food and eating? No   Do you have difficulty bathing yourself? No   Do you have difficulty getting dressed? No   Do you have difficulty using the toilet? No   Do you have difficulty moving around from place to place? No   In the past year have you fallen or experienced a near fall? No   Do you need help using the phone?  No   Are you deaf or do you have serious difficulty hearing?  Yes   Do you need help with transportation? No   Do you need help shopping? No   Do you need help preparing meals?  No   Do you need help with housework?  No   Do you need help with laundry? No   Do you need help taking your medications? No   Do you need help managing money? No   Do you have difficulty concentrating, remembering or making decisions? No       Health Habits  Current Diet: Well Balanced Diet  Dental Exam: Not up to date  Eye Exam: Not up to date  Exercise (times per week): 2  times per week  Current Exercise Activities Include: Yard Work          Does the patient have evidence of cognitive impairment? No    Asiprin use counseling: Does not need ASA (and currently is not on it)      Recent Lab Results:    Visual Acuity:  No exam data present    Age-appropriate Screening Schedule:  Refer to the list below for future screening recommendations based on patient's age, sex and/or medical conditions. Orders for these recommended tests are listed in the plan section. The patient has been provided with a written plan.    Health Maintenance   Topic Date Due   • TDAP/TD VACCINES (1 - Tdap) 01/02/2013   • INFLUENZA VACCINE  08/01/2017   • LIPID PANEL  11/07/2017   • COLONOSCOPY  05/03/2021   • PNEUMOCOCCAL VACCINE (19-64 MEDIUM RISK)  Completed        Subjective   History of Present Illness    Favian Galeana is a 56 y.o. male who presents for an Annual Wellness Visit.    The following portions of the patient's history were reviewed and updated as appropriate: allergies, current medications, past family history, past medical history, past social history, past surgical history and problem list.    Outpatient Medications Prior to Visit   Medication Sig Dispense Refill   • albuterol (PROVENTIL HFA;VENTOLIN HFA) 108 (90 BASE) MCG/ACT inhaler Inhale 2 puffs Every 4 (Four) Hours As Needed for wheezing. 1 inhaler 0   • amLODIPine (NORVASC) 10 MG tablet Take 1 tablet by mouth Daily. (Patient taking differently: Take 10 mg by mouth Daily. Takes at night) 30 tablet 5   • atorvastatin (LIPITOR) 40 MG tablet Take 1 tablet by mouth Daily. 30 tablet 5   • gabapentin (NEURONTIN) 300 MG capsule Take 3 capsules by mouth 3 (Three) Times a Day. 270 capsule 5   • hydrOXYzine (VISTARIL) 50 MG capsule Take 50 mg by mouth 3 (Three) Times a Day As Needed.  2   • losartan-hydrochlorothiazide (HYZAAR) 100-25 MG per tablet Take 1 tablet by mouth Daily. 30 tablet 5   • omeprazole (PriLOSEC) 40 MG capsule Take 1 capsule by  "mouth Daily. 90 capsule 3   • oxyCODONE-acetaminophen (PERCOCET)  MG per tablet Take 1 tablet by mouth 3 (Three) Times a Day.     • bisacodyl (DULCOLAX) 5 MG EC tablet Take 5 mg by mouth Daily As Needed for constipation.     • meloxicam (MOBIC) 7.5 MG tablet Take 1 tablet by mouth Daily. 14 tablet 0   • phenylephrine-promethazine-codeine (PHENERGAN VC with CODEINE) 5-6.25-10 MG/5ML syrup syrup Take 5 mL by mouth Every 4 (Four) Hours As Needed (cough). 118 mL 0     No facility-administered medications prior to visit.        Patient Active Problem List   Diagnosis   • Neuropathy   • Gastroesophageal reflux disease without esophagitis   • Encounter for preventive health examination   • Essential hypertension   • Tobacco abuse   • Mixed hyperlipidemia   • Primary osteoarthritis of right hip   • Overweight   • Vasculogenic erectile dysfunction       Advance Care Planning:  has NO advance directive - information provided to the patient today    Identification of Risk Factors:  Risk factors include: weight , unhealthy diet, cardiovascular risk, inactivity, tobacco use and hearing limitations.    Review of Systems See  note    Compared to one year ago, the patient feels his physical health is worse.  Compared to one year ago, the patient feels his mental health is the same.    Objective     Physical Exam See  note    Vitals:    07/24/17 1028   BP: 138/78   BP Location: Left arm   Patient Position: Sitting   Cuff Size: Adult   Pulse: (!) 137   Resp: 16   SpO2: 98%   Weight: 198 lb 1.6 oz (89.9 kg)   Height: 71\" (180.3 cm)       Body mass index is 27.63 kg/(m^2).  Discussed the patient's BMI with him. The BMI is above average; BMI management plan is completed.    Assessment/Plan   Patient Self-Management and Personalized Health Advice  The patient has been provided with information about: diet, exercise, weight management, tobacco cessation, fall prevention and designing advance directives and " preventive services including:   · Advance directive, Fall Risk assessment done, Influenza vaccine, Prostate cancer screening discussed, TdaP vaccine.    Visit Diagnoses:    ICD-10-CM ICD-9-CM   1. Tobacco abuse Z72.0 305.1   2. Mixed hyperlipidemia E78.2 272.2   3. Primary osteoarthritis of right hip M16.11 715.15   4. Essential hypertension I10 401.9   5. Encounter for preventive health examination Z00.00 V70.0   6. Vasculogenic erectile dysfunction, unspecified vasculogenic erectile dysfunction type N52.9 607.84       Orders Placed This Encounter   Procedures   • Basic Metabolic Panel     Standing Status:   Future     Number of Occurrences:   1     Standing Expiration Date:   7/24/2018   • Lipid Panel     Standing Status:   Future     Number of Occurrences:   1     Standing Expiration Date:   7/24/2018   • Hepatitis C Antibody     Standing Status:   Future     Number of Occurrences:   1     Standing Expiration Date:   7/24/2018   • Ambulatory Referral to Orthopedic Surgery     Referral Priority:   Routine     Referral Type:   Consultation     Referral Reason:   Specialty Services Required     Requested Specialty:   Orthopedic Surgery     Number of Visits Requested:   1       Outpatient Encounter Prescriptions as of 7/24/2017   Medication Sig Dispense Refill   • albuterol (PROVENTIL HFA;VENTOLIN HFA) 108 (90 BASE) MCG/ACT inhaler Inhale 2 puffs Every 4 (Four) Hours As Needed for wheezing. 1 inhaler 0   • amLODIPine (NORVASC) 10 MG tablet Take 1 tablet by mouth Daily. (Patient taking differently: Take 10 mg by mouth Daily. Takes at night) 30 tablet 5   • atorvastatin (LIPITOR) 40 MG tablet Take 1 tablet by mouth Daily. 30 tablet 5   • gabapentin (NEURONTIN) 300 MG capsule Take 3 capsules by mouth 3 (Three) Times a Day. 270 capsule 5   • hydrOXYzine (VISTARIL) 50 MG capsule Take 50 mg by mouth 3 (Three) Times a Day As Needed.  2   • losartan-hydrochlorothiazide (HYZAAR) 100-25 MG per tablet Take 1 tablet by mouth  Daily. 30 tablet 5   • omeprazole (PriLOSEC) 40 MG capsule Take 1 capsule by mouth Daily. 90 capsule 3   • oxyCODONE-acetaminophen (PERCOCET)  MG per tablet Take 1 tablet by mouth 3 (Three) Times a Day.     • [DISCONTINUED] bisacodyl (DULCOLAX) 5 MG EC tablet Take 5 mg by mouth Daily As Needed for constipation.     • cyclobenzaprine (FLEXERIL) 10 MG tablet Take 1 tablet by mouth 3 (Three) Times a Day.  0   • meloxicam (MOBIC) 7.5 MG tablet Take 1 tablet by mouth Daily. 14 tablet 0   • sildenafil (REVATIO) 20 MG tablet Take 2-3 tabs PO daily prn erectile dysfunction. 30 tablet 3   • [DISCONTINUED] phenylephrine-promethazine-codeine (PHENERGAN VC with CODEINE) 5-6.25-10 MG/5ML syrup syrup Take 5 mL by mouth Every 4 (Four) Hours As Needed (cough). 118 mL 0     No facility-administered encounter medications on file as of 7/24/2017.        Reviewed use of high risk medication in the elderly: yes  Reviewed for potential of harmful drug interactions in the elderly: yes    Follow Up:  Return in about 6 months (around 1/24/2018) for Recheck.     An After Visit Summary and PPPS with all of these plans were given to the patient.

## 2017-07-24 NOTE — PATIENT INSTRUCTIONS
Medicare Wellness  Personal Prevention Plan of Service     Date of Office Visit:  2017  Encounter Provider:  Jenaro Riley DO  Place of Service:  [facility]  Patient Name: Favian Galeana  :  1960    As part of the Medicare Wellness portion of your visit today, we are providing you with this personalized preventive plan of services (PPPS). This plan is based upon recommendations of the [place] Preventive Services Task Force (USPSTF) and the Advisory Committee on Immunization Practices (ACIP).    This lists the preventive care services that should be considered, and provides dates of when you are due. Items listed as completed are up-to-date and do not require any further intervention.    Health Maintenance   Topic Date Due   • TDAP/TD VACCINES (1 - Tdap) 1979   • HEPATITIS C SCREENING  2016   • MEDICARE ANNUAL WELLNESS  2016   • INFLUENZA VACCINE  2017   • LIPID PANEL  2017   • COLONOSCOPY  2021   • PNEUMOCOCCAL VACCINE (19-64 MEDIUM RISK)  Completed       Orders Placed This Encounter   Procedures   • Basic Metabolic Panel     Standing Status:   Future     Standing Expiration Date:   2018   • Lipid Panel     Standing Status:   Future     Standing Expiration Date:   2018   • Hepatitis C Antibody     Standing Status:   Future     Standing Expiration Date:   2018   • Ambulatory Referral to Orthopedic Surgery     Referral Priority:   Routine     Referral Type:   Consultation     Referral Reason:   Specialty Services Required     Requested Specialty:   Orthopedic Surgery     Number of Visits Requested:   1       Return in about 6 months (around 2018) for Recheck.

## 2017-07-24 NOTE — PROGRESS NOTES
CC: Follow-up for right hip pain    History:  Favian Galeana is a 56 y.o. male who presents today for follow-up for evaluation of the above:  He reports he has been doing reasonably well, though he has had an increase in the pain of his right hip.  He recently went to the emergency department and was diagnosed with severe osteoarthritis diagnosed by roentgenogram of the right hip.  He notes he has never seen anyone in particular for the hip, but he does continue seeing pain management for his chronic low back pain.  He notes that his interventions there have not been successful in controlling his hip pain.  He has been taking his Percocet as prescribed per pain management and also takes meloxicam, though his pain is only partially controlled.  He does continue on his blood pressure medications with good control per his report and no side effects.  He also tolerates Lipitor without ill effects.      ROS:  Review of Systems   Constitutional: Negative for chills and fever.   Respiratory: Negative for cough and shortness of breath.    Cardiovascular: Negative for chest pain, palpitations and leg swelling.   Musculoskeletal: Positive for arthralgias, back pain and gait problem.       Mr. Galeana  reports that he has been smoking.  He has been smoking about 0.50 packs per day. He has never used smokeless tobacco. He reports that he drinks alcohol. He reports that he does not use illicit drugs.      Current Outpatient Prescriptions:   •  albuterol (PROVENTIL HFA;VENTOLIN HFA) 108 (90 BASE) MCG/ACT inhaler, Inhale 2 puffs Every 4 (Four) Hours As Needed for wheezing., Disp: 1 inhaler, Rfl: 0  •  amLODIPine (NORVASC) 10 MG tablet, Take 1 tablet by mouth Daily. (Patient taking differently: Take 10 mg by mouth Daily. Takes at night), Disp: 30 tablet, Rfl: 5  •  atorvastatin (LIPITOR) 40 MG tablet, Take 1 tablet by mouth Daily., Disp: 30 tablet, Rfl: 5  •  gabapentin (NEURONTIN) 300 MG capsule, Take 3 capsules by mouth 3  "(Three) Times a Day., Disp: 270 capsule, Rfl: 5  •  hydrOXYzine (VISTARIL) 50 MG capsule, Take 50 mg by mouth 3 (Three) Times a Day As Needed., Disp: , Rfl: 2  •  losartan-hydrochlorothiazide (HYZAAR) 100-25 MG per tablet, Take 1 tablet by mouth Daily., Disp: 30 tablet, Rfl: 5  •  omeprazole (PriLOSEC) 40 MG capsule, Take 1 capsule by mouth Daily., Disp: 90 capsule, Rfl: 3  •  oxyCODONE-acetaminophen (PERCOCET)  MG per tablet, Take 1 tablet by mouth 3 (Three) Times a Day., Disp: , Rfl:   •  cyclobenzaprine (FLEXERIL) 10 MG tablet, Take 1 tablet by mouth 3 (Three) Times a Day., Disp: , Rfl: 0  •  meloxicam (MOBIC) 7.5 MG tablet, Take 1 tablet by mouth Daily., Disp: 14 tablet, Rfl: 0      OBJECTIVE:  /78 (BP Location: Left arm, Patient Position: Sitting, Cuff Size: Adult)  Pulse (!) 137  Resp 16  Ht 71\" (180.3 cm)  Wt 198 lb 1.6 oz (89.9 kg)  SpO2 98%  BMI 27.63 kg/m2   Physical Exam   Constitutional: He is oriented to person, place, and time. He appears well-nourished. No distress.   Cardiovascular: Normal rate, regular rhythm and normal heart sounds.    No murmur heard.  Pulmonary/Chest: Effort normal and breath sounds normal. He has no wheezes.   Neurological: He is alert and oriented to person, place, and time.   Psychiatric: He has a normal mood and affect.       Assessment/Plan    Diagnoses and all orders for this visit:    Primary osteoarthritis of right hip  -     Ambulatory Referral to Orthopedic Surgery  We will refer to orthopedic surgery related to his severe osteoarthritis as documented by his roentgenogram.  He will continue on pain medications as prescribed per pain management.  He remains hesitant toward the idea of potential hip surgery, but is more open to the idea of injection if this is possible to manage his pain better.    Tobacco abuse  Patient was counseled on and understood the many dangers of continuing to use tobacco. Despite this, Mr. Galeana states quitting is not an " immediate priority at this time. I reminded the patient that if quitting becomes an increased priority to contact us for help with quitting including pharmacologic & nonpharmacologic options or any additional resources.  He understands he needs to quit and it is stressed that this would be ideal to be done before any intervention on his hip.    Mixed hyperlipidemia  -     Lipid Panel; Future  Stable on high intensity statin therapy per ACC/AHA guidelines.    Essential hypertension  -     Basic Metabolic Panel; Future  Well controlled, BP goal for age is <140/90 per JNC 8 guidelines and continue current medications    Encounter for preventive health examination  -     Hepatitis C Antibody; Future    Vasculogenic erectile dysfunction, unspecified vasculogenic erectile dysfunction type  -     sildenafil (REVATIO) 20 MG tablet; Take 2-3 tabs PO daily prn erectile dysfunction.  Prescription was given for sildenafil.  He understands this is off label use at the current dose, though it is the same generic present in Viagra.  He also understands he will pay cash for this and is recommended to take the prescription to his pharmacy to gauge the cost.      An After Visit Summary was printed and given to the patient at discharge.  Return in about 6 months (around 1/24/2018) for Recheck. Sooner if problems arise.         Jenaro Riley D.O. 7/24/2017

## 2017-07-27 DIAGNOSIS — R94.5 ABNORMAL RESULTS OF LIVER FUNCTION STUDIES: ICD-10-CM

## 2017-07-27 DIAGNOSIS — R76.8 POSITIVE HEPATITIS C ANTIBODY TEST: Primary | ICD-10-CM

## 2017-09-29 DIAGNOSIS — K21.9 GASTROESOPHAGEAL REFLUX DISEASE WITHOUT ESOPHAGITIS: ICD-10-CM

## 2017-09-29 RX ORDER — OMEPRAZOLE 40 MG/1
40 CAPSULE, DELAYED RELEASE ORAL DAILY
Qty: 90 CAPSULE | Refills: 3 | Status: SHIPPED | OUTPATIENT
Start: 2017-09-29

## 2022-11-09 NOTE — DISCHARGE INSTRUCTIONS
Please call tomorrow to schedule a follow-up appointment with Dr. Prieto.  Please return to ER as needed sooner if any new or worsening symptoms.  Use medications as prescribed.  Please follow-up to primary care provider tomorrow for a recheck.   Suture Removal: 14 days

## (undated) DEVICE — SUT SILK 2/0 SH CR8 18IN CR8 C012D

## (undated) DEVICE — SPNG DISSCT SECTO CHRRY PK/5

## (undated) DEVICE — SUT PROLN 0 MO7 CR8 18IN C841G

## (undated) DEVICE — ANTIBACTERIAL UNDYED BRAIDED (POLYGLACTIN 910), SYNTHETIC ABSORBABLE SUTURE: Brand: COATED VICRYL

## (undated) DEVICE — DRSNG SURESITE WNDW 4X4.5

## (undated) DEVICE — TRY PREP SCRB VAG PVP

## (undated) DEVICE — PK TURNOVER RM ADV

## (undated) DEVICE — 3M™ STERI-STRIP™ REINFORCED ADHESIVE SKIN CLOSURES, R1547, 1/2 IN X 4 IN (12 MM X 100 MM), 6 STRIPS/ENVELOPE: Brand: 3M™ STERI-STRIP™

## (undated) DEVICE — PAD MINOR UNIVERSAL: Brand: MEDLINE INDUSTRIES, INC.

## (undated) DEVICE — GLV SURG BIOGEL LTX PF 6 1/2

## (undated) DEVICE — ELECTRD BLD EDGE/INSUL1P 2.4X5.1MM STRL

## (undated) DEVICE — SPNG GZ STRL 2S 4X4 12PLY

## (undated) DEVICE — 3M™ IOBAN™ 2 ANTIMICROBIAL INCISE DRAPE 6650EZ: Brand: IOBAN™ 2

## (undated) DEVICE — ADHS LIQ MASTISOL 2/3ML

## (undated) DEVICE — DRN PENRS 5/8X18IN LTX